# Patient Record
Sex: FEMALE | Race: WHITE | NOT HISPANIC OR LATINO | Employment: UNEMPLOYED | URBAN - NONMETROPOLITAN AREA
[De-identification: names, ages, dates, MRNs, and addresses within clinical notes are randomized per-mention and may not be internally consistent; named-entity substitution may affect disease eponyms.]

---

## 2018-03-15 ENCOUNTER — TRANSCRIBE ORDERS (OUTPATIENT)
Dept: CARDIOLOGY | Facility: HOSPITAL | Age: 73
End: 2018-03-15

## 2018-03-15 DIAGNOSIS — R07.9 CHEST PAIN, UNSPECIFIED TYPE: Primary | ICD-10-CM

## 2018-03-20 ENCOUNTER — APPOINTMENT (OUTPATIENT)
Dept: CARDIOLOGY | Facility: HOSPITAL | Age: 73
End: 2018-03-20

## 2018-03-20 ENCOUNTER — OUTSIDE FACILITY SERVICE (OUTPATIENT)
Dept: CARDIOLOGY | Facility: CLINIC | Age: 73
End: 2018-03-20

## 2018-03-20 ENCOUNTER — HOSPITAL ENCOUNTER (OUTPATIENT)
Dept: CARDIOLOGY | Facility: HOSPITAL | Age: 73
Discharge: HOME | End: 2018-03-20

## 2018-03-20 ENCOUNTER — HOSPITAL ENCOUNTER (OUTPATIENT)
Dept: CARDIOLOGY | Facility: HOSPITAL | Age: 73
End: 2018-03-20

## 2018-03-20 VITALS — HEIGHT: 64 IN | BODY MASS INDEX: 39.61 KG/M2 | WEIGHT: 232 LBS

## 2018-03-20 DIAGNOSIS — R07.9 CHEST PAIN, UNSPECIFIED TYPE: ICD-10-CM

## 2018-03-20 LAB
MAXIMAL PREDICTED HEART RATE: 149 BPM
STRESS TARGET HR: 127 BPM

## 2018-03-20 PROCEDURE — 93306 TTE W/DOPPLER COMPLETE: CPT

## 2018-03-20 PROCEDURE — 93306 TTE W/DOPPLER COMPLETE: CPT | Performed by: INTERNAL MEDICINE

## 2018-04-02 ENCOUNTER — APPOINTMENT (OUTPATIENT)
Dept: CARDIOLOGY | Facility: HOSPITAL | Age: 73
End: 2018-04-02

## 2018-04-04 ENCOUNTER — DOCUMENTATION (OUTPATIENT)
Dept: CARDIOLOGY | Facility: CLINIC | Age: 73
End: 2018-04-04

## 2018-04-04 NOTE — PROGRESS NOTES
PATIENT AWARE OF ECHO  RESULTS, AND TO PROCEED WITH GETTING HER STRESS TEST DONE ON 4-17-18.   PH,LPN

## 2018-04-17 ENCOUNTER — APPOINTMENT (OUTPATIENT)
Dept: CARDIOLOGY | Facility: HOSPITAL | Age: 73
End: 2018-04-17

## 2018-04-23 ENCOUNTER — HOSPITAL ENCOUNTER (OUTPATIENT)
Dept: CARDIOLOGY | Facility: HOSPITAL | Age: 73
Discharge: HOME | End: 2018-04-23

## 2018-04-23 ENCOUNTER — OUTSIDE FACILITY SERVICE (OUTPATIENT)
Dept: CARDIOLOGY | Facility: CLINIC | Age: 73
End: 2018-04-23

## 2018-04-23 DIAGNOSIS — R07.9 CHEST PAIN, UNSPECIFIED TYPE: ICD-10-CM

## 2018-04-23 LAB
MAXIMAL PREDICTED HEART RATE: 149 BPM
STRESS TARGET HR: 127 BPM

## 2018-04-23 PROCEDURE — 78452 HT MUSCLE IMAGE SPECT MULT: CPT | Performed by: INTERNAL MEDICINE

## 2018-04-23 PROCEDURE — 78452 HT MUSCLE IMAGE SPECT MULT: CPT

## 2018-04-23 PROCEDURE — 25010000002 REGADENOSON 0.4 MG/5ML SOLUTION: Performed by: INTERNAL MEDICINE

## 2018-04-23 PROCEDURE — 93018 CV STRESS TEST I&R ONLY: CPT | Performed by: INTERNAL MEDICINE

## 2018-04-23 PROCEDURE — 93017 CV STRESS TEST TRACING ONLY: CPT

## 2018-04-23 PROCEDURE — 0 TECHNETIUM SESTAMIBI: Performed by: INTERNAL MEDICINE

## 2018-04-23 PROCEDURE — A9500 TC99M SESTAMIBI: HCPCS | Performed by: INTERNAL MEDICINE

## 2018-04-23 RX ADMIN — TECHNETIUM TC 99M SESTAMIBI 1 DOSE: 1 INJECTION INTRAVENOUS at 09:00

## 2018-04-23 RX ADMIN — REGADENOSON 0.4 MG: 0.08 INJECTION, SOLUTION INTRAVENOUS at 09:00

## 2018-04-26 ENCOUNTER — OFFICE VISIT (OUTPATIENT)
Dept: CARDIOLOGY | Facility: CLINIC | Age: 73
End: 2018-04-26

## 2018-04-26 VITALS
HEART RATE: 83 BPM | SYSTOLIC BLOOD PRESSURE: 127 MMHG | WEIGHT: 232.4 LBS | HEIGHT: 64 IN | OXYGEN SATURATION: 94 % | BODY MASS INDEX: 39.67 KG/M2 | DIASTOLIC BLOOD PRESSURE: 79 MMHG

## 2018-04-26 DIAGNOSIS — R07.9 CHEST PAIN, UNSPECIFIED TYPE: ICD-10-CM

## 2018-04-26 DIAGNOSIS — R06.02 SHORTNESS OF BREATH: Primary | ICD-10-CM

## 2018-04-26 DIAGNOSIS — I10 ESSENTIAL HYPERTENSION: ICD-10-CM

## 2018-04-26 DIAGNOSIS — R00.2 PALPITATIONS: ICD-10-CM

## 2018-04-26 DIAGNOSIS — R53.83 OTHER FATIGUE: ICD-10-CM

## 2018-04-26 PROCEDURE — 99204 OFFICE O/P NEW MOD 45 MIN: CPT | Performed by: PHYSICIAN ASSISTANT

## 2018-04-26 RX ORDER — HYDROCHLOROTHIAZIDE 25 MG/1
25 TABLET ORAL DAILY
COMMUNITY

## 2018-04-26 RX ORDER — IBUPROFEN 800 MG/1
800 TABLET ORAL EVERY 6 HOURS PRN
COMMUNITY

## 2018-04-26 RX ORDER — AMITRIPTYLINE HYDROCHLORIDE 25 MG/1
25 TABLET, FILM COATED ORAL NIGHTLY
COMMUNITY
End: 2018-04-26

## 2018-04-26 RX ORDER — PAROXETINE HYDROCHLORIDE 20 MG/1
20 TABLET, FILM COATED ORAL 2 TIMES DAILY
COMMUNITY
End: 2018-04-26 | Stop reason: ALTCHOICE

## 2018-04-26 RX ORDER — METOPROLOL TARTRATE 50 MG/1
50 TABLET, FILM COATED ORAL 2 TIMES DAILY
COMMUNITY

## 2018-04-26 RX ORDER — AMLODIPINE BESYLATE 5 MG/1
5 TABLET ORAL DAILY
COMMUNITY
End: 2018-04-26 | Stop reason: ALTCHOICE

## 2018-04-26 RX ORDER — DICLOFENAC SODIUM 75 MG/1
75 TABLET, DELAYED RELEASE ORAL 2 TIMES DAILY
Qty: 60 TABLET | Refills: 6 | Status: SHIPPED | OUTPATIENT
Start: 2018-04-26 | End: 2018-04-26

## 2018-04-26 RX ORDER — SIMVASTATIN 40 MG
40 TABLET ORAL NIGHTLY
COMMUNITY

## 2018-04-26 RX ORDER — NITROGLYCERIN 0.4 MG/1
0.4 TABLET SUBLINGUAL AS NEEDED
COMMUNITY

## 2018-04-26 RX ORDER — OMEPRAZOLE 20 MG/1
20 CAPSULE, DELAYED RELEASE ORAL DAILY
COMMUNITY

## 2018-04-26 RX ORDER — TRAMADOL HYDROCHLORIDE 50 MG/1
50 TABLET ORAL EVERY 8 HOURS PRN
COMMUNITY

## 2018-04-26 RX ORDER — ESCITALOPRAM OXALATE 10 MG/1
10 TABLET ORAL DAILY
COMMUNITY

## 2018-04-26 NOTE — PROGRESS NOTES
Subjective   Jennifer Vidal is a 72 y.o. female     Chief Complaint   Patient presents with   • Establish Care     presents to establish care (stress and echo f/u)   • Chest Pain   • Palpitations   • Shortness of Breath   • Fatigue       HPI    Problem list  1.  Chest pain  1.1 stress test April 2018 demonstrates no evidence of ischemia and preserved LV function  2.  Mild aortic and mitral insufficiency  3.  Preserved systolic function  4.  Hypertension  5.  Dyslipidemia  6.  Type 2 diabetes mellitus  7.  Untreated sleep apnea because of patient noncompliance    Patient is a 72-year-old female that presents to the office today to establish care.  Recently she had stress test and echocardiogram ordered by primary physician because of her symptoms.  Stress test did not demonstrate any evidence of ischemia and echocardiogram was largely normal other than mild aortic and mitral insufficiency.    Patient describes to me that she has been having intermittent episodes of chest discomfort.  She describes substernal discomfort that is actually improved whenever she puts pressure on her sternum.  Patient will develop a slight aching sensation but will hold pressure on her sternum and her chest discomfort improves.  Chest pain occurs at random and only lasts for short periods of time.  Her shortness of breath is mild at baseline but nothing that has been progressive.  She describes no PND orthopnea.  Palpitations happen on occasion but no dizziness presyncope or syncope.    Patient does complain of much fatigue.  She describes sleeping frequently and having a lack of energy.  She describes being diagnosed with obstructive sleep apnea but could not tolerate the machine because of the noise.  Otherwise patient is doing well      Current Outpatient Prescriptions   Medication Sig Dispense Refill   • aspirin 81 MG tablet Take 81 mg by mouth Daily.     • escitalopram (LEXAPRO) 10 MG tablet Take 10 mg by mouth Daily.     •  Fluticasone Furoate-Vilanterol (BREO ELLIPTA) 100-25 MCG/INH aerosol powder  Inhale Daily.     • hydrochlorothiazide (HYDRODIURIL) 25 MG tablet Take 25 mg by mouth Daily.     • ibuprofen (ADVIL,MOTRIN) 800 MG tablet Take 800 mg by mouth Every 6 (Six) Hours As Needed for Mild Pain .     • metFORMIN (GLUCOPHAGE) 500 MG tablet Take 500 mg by mouth 2 (Two) Times a Day With Meals.     • metoprolol tartrate (LOPRESSOR) 50 MG tablet Take 50 mg by mouth 2 (Two) Times a Day.     • nitroglycerin (NITROSTAT) 0.4 MG SL tablet Place 0.4 mg under the tongue As Needed for Chest Pain. Take no more than 3 doses in 15 minutes.     • omeprazole (priLOSEC) 20 MG capsule Take 20 mg by mouth Daily.     • simvastatin (ZOCOR) 40 MG tablet Take 40 mg by mouth Every Night.     • traMADol (ULTRAM) 50 MG tablet Take 50 mg by mouth Every 8 (Eight) Hours As Needed for Moderate Pain .       No current facility-administered medications for this visit.        Codeine and Sulfa antibiotics    Past Medical History:   Diagnosis Date   • Diabetes mellitus    • Hyperlipidemia    • Hypertension        Social History     Social History   • Marital status: Unknown     Spouse name: N/A   • Number of children: N/A   • Years of education: N/A     Occupational History   • Not on file.     Social History Main Topics   • Smoking status: Never Smoker   • Smokeless tobacco: Never Used   • Alcohol use No   • Drug use: No   • Sexual activity: Not on file     Other Topics Concern   • Not on file     Social History Narrative   • No narrative on file           Family History   Problem Relation Age of Onset   • COPD Mother    • Heart failure Mother    • Heart attack Father    • Diabetes Father        Review of Systems   Constitutional: Positive for diaphoresis (night sweats) and fatigue (chronic).   HENT: Negative.    Eyes: Positive for visual disturbance (wears glasses).   Respiratory: Positive for shortness of breath (on exertion and times at rest).    Cardiovascular:  "Positive for chest pain, palpitations and leg swelling.   Gastrointestinal: Positive for diarrhea.   Endocrine: Negative.    Genitourinary: Negative.    Musculoskeletal: Positive for arthralgias, back pain, myalgias and neck pain.   Skin: Negative.    Allergic/Immunologic: Negative.  Negative for environmental allergies.   Neurological: Positive for dizziness, weakness (generalized) and light-headedness (after stress test ). Negative for syncope.   Hematological: Bruises/bleeds easily (bruise).   Psychiatric/Behavioral: Positive for agitation. Negative for sleep disturbance. The patient is nervous/anxious.    All other systems reviewed and are negative.      Objective   Vitals:    04/26/18 0918   BP: 127/79   BP Location: Left arm   Patient Position: Sitting   Pulse: 83   SpO2: 94%   Weight: 105 kg (232 lb 6.4 oz)   Height: 162.6 cm (64\")      /79 (BP Location: Left arm, Patient Position: Sitting)   Pulse 83   Ht 162.6 cm (64\")   Wt 105 kg (232 lb 6.4 oz)   SpO2 94%   BMI 39.89 kg/m²     Lab Results (most recent)     None          Physical Exam   Constitutional: She is oriented to person, place, and time. She appears well-developed and well-nourished. No distress.   HENT:   Head: Normocephalic and atraumatic.   Eyes: EOM are normal. Pupils are equal, round, and reactive to light.   Neck: No JVD present.   Cardiovascular: Normal rate, regular rhythm, normal heart sounds and intact distal pulses.  Exam reveals no gallop and no friction rub.    No murmur heard.  Pulmonary/Chest: Effort normal and breath sounds normal. No respiratory distress. She has no wheezes. She has no rales. She exhibits no tenderness.   Musculoskeletal: Normal range of motion. She exhibits no edema.   Neurological: She is alert and oriented to person, place, and time. No cranial nerve deficit.   Skin: Skin is warm and dry. No rash noted. No erythema. No pallor.   Psychiatric: She has a normal mood and affect. Her behavior is normal. "   Nursing note and vitals reviewed.      Procedure   Procedures         Assessment/Plan     Problems Addressed this Visit        Cardiovascular and Mediastinum    Essential hypertension    Relevant Medications    metoprolol tartrate (LOPRESSOR) 50 MG tablet    hydrochlorothiazide (HYDRODIURIL) 25 MG tablet    Palpitations       Respiratory    Shortness of breath - Primary       Nervous and Auditory    Chest pain       Other    Other fatigue      Other Visit Diagnoses    None.           Recommendation  1.  Patient's chest discomfort is clearly atypical in the fact that it is improved with palpation of her chest wall.  I feel that there could be an underlying musculoskeletal component of her chest discomfort and in that setting would like to try to treat with anti-inflammatory medications.  We will prescribe Voltaren and see her back in 3-4 weeks to see if there is any improvement.  We can consider further evaluation if her symptoms fail to improve but as of now, her symptoms are clearly atypical  2.  We discussed further cardiac evaluation but her symptoms are clearly atypical at this time.  She voices much concerned about why she is so fatigued whenever she is not compliant with CPAP therapy.  I discussed with her that sleep apnea and possibly her blood pressure medications are likely the cause of her fatigue.  I discussed with her that she could see a pulmonologist who may be able to help in regards to adjusting her CPAP device to help her tolerate it better.  3.  We will see her back for follow-up as scheduled.  She will follow-up with primary as scheduled                Patient's Body mass index is 39.89 kg/m². BMI is above normal parameters. Follow-up plan includes:  educational material.         Electronically signed by:

## 2018-04-26 NOTE — PATIENT INSTRUCTIONS
Heart-Healthy Eating Plan  Many factors influence your heart health, including eating and exercise habits. Heart (coronary) risk increases with abnormal blood fat (lipid) levels. Heart-healthy meal planning includes limiting unhealthy fats, increasing healthy fats, and making other small dietary changes. This includes maintaining a healthy body weight to help keep lipid levels within a normal range.  What is my plan?  Your health care provider recommends that you:  · Get no more than _________% of the total calories in your daily diet from fat.  · Limit your intake of saturated fat to less than _________% of your total calories each day.  · Limit the amount of cholesterol in your diet to less than _________ mg per day.  What types of fat should I choose?  · Choose healthy fats more often. Choose monounsaturated and polyunsaturated fats, such as olive oil and canola oil, flaxseeds, walnuts, almonds, and seeds.  · Eat more omega-3 fats. Good choices include salmon, mackerel, sardines, tuna, flaxseed oil, and ground flaxseeds. Aim to eat fish at least two times each week.  · Limit saturated fats. Saturated fats are primarily found in animal products, such as meats, butter, and cream. Plant sources of saturated fats include palm oil, palm kernel oil, and coconut oil.  · Avoid foods with partially hydrogenated oils in them. These contain trans fats. Examples of foods that contain trans fats are stick margarine, some tub margarines, cookies, crackers, and other baked goods.  What general guidelines do I need to follow?  · Check food labels carefully to identify foods with trans fats or high amounts of saturated fat.  · Fill one half of your plate with vegetables and green salads. Eat 4-5 servings of vegetables per day. A serving of vegetables equals 1 cup of raw leafy vegetables, ½ cup of raw or cooked cut-up vegetables, or ½ cup of vegetable juice.  · Fill one fourth of your plate with whole grains. Look for the word  "\"whole\" as the first word in the ingredient list.  · Fill one fourth of your plate with lean protein foods.  · Eat 4-5 servings of fruit per day. A serving of fruit equals one medium whole fruit, ¼ cup of dried fruit, ½ cup of fresh, frozen, or canned fruit, or ½ cup of 100% fruit juice.  · Eat more foods that contain soluble fiber. Examples of foods that contain this type of fiber are apples, broccoli, carrots, beans, peas, and barley. Aim to get 20-30 g of fiber per day.  · Eat more home-cooked food and less restaurant, buffet, and fast food.  · Limit or avoid alcohol.  · Limit foods that are high in starch and sugar.  · Avoid fried foods.  · Cook foods by using methods other than frying. Baking, boiling, grilling, and broiling are all great options. Other fat-reducing suggestions include:  ¨ Removing the skin from poultry.  ¨ Removing all visible fats from meats.  ¨ Skimming the fat off of stews, soups, and gravies before serving them.  ¨ Steaming vegetables in water or broth.  · Lose weight if you are overweight. Losing just 5-10% of your initial body weight can help your overall health and prevent diseases such as diabetes and heart disease.  · Increase your consumption of nuts, legumes, and seeds to 4-5 servings per week. One serving of dried beans or legumes equals ½ cup after being cooked, one serving of nuts equals 1½ ounces, and one serving of seeds equals ½ ounce or 1 tablespoon.  · You may need to monitor your salt (sodium) intake, especially if you have high blood pressure. Talk with your health care provider or dietitian to get more information about reducing sodium.  What foods can I eat?  Grains     Breads, including Ivorian, white, juan alberto, wheat, raisin, rye, oatmeal, and Italian. Tortillas that are neither fried nor made with lard or trans fat. Low-fat rolls, including hotdog and hamburger buns and English muffins. Biscuits. Muffins. Waffles. Pancakes. Light popcorn. Whole-grain cereals. Flatbread. " Elle toast. Pretzels. Breadsticks. Rusks. Low-fat snacks and crackers, including oyster, saltine, matzo, lex, animal, and rye. Rice and pasta, including brown rice and those that are made with whole wheat.  Vegetables   All vegetables.  Fruits   All fruits, but limit coconut.  Meats and Other Protein Sources   Lean, well-trimmed beef, veal, pork, and lamb. Chicken and turkey without skin. All fish and shellfish. Wild duck, rabbit, pheasant, and venison. Egg whites or low-cholesterol egg substitutes. Dried beans, peas, lentils, and tofu. Seeds and most nuts.  Dairy   Low-fat or nonfat cheeses, including ricotta, string, and mozzarella. Skim or 1% milk that is liquid, powdered, or evaporated. Buttermilk that is made with low-fat milk. Nonfat or low-fat yogurt.  Beverages   Mineral water. Diet carbonated beverages.  Sweets and Desserts   Sherbets and fruit ices. Honey, jam, marmalade, jelly, and syrups. Meringues and gelatins. Pure sugar candy, such as hard candy, jelly beans, gumdrops, mints, marshmallows, and small amounts of dark chocolate. Efrem food cake.  Eat all sweets and desserts in moderation.  Fats and Oils   Nonhydrogenated (trans-free) margarines. Vegetable oils, including soybean, sesame, sunflower, olive, peanut, safflower, corn, canola, and cottonseed. Salad dressings or mayonnaise that are made with a vegetable oil. Limit added fats and oils that you use for cooking, baking, salads, and as spreads.  Other   Cocoa powder. Coffee and tea. All seasonings and condiments.  The items listed above may not be a complete list of recommended foods or beverages. Contact your dietitian for more options.   What foods are not recommended?  Grains   Breads that are made with saturated or trans fats, oils, or whole milk. Croissants. Butter rolls. Cheese breads. Sweet rolls. Donuts. Buttered popcorn. Chow mein noodles. High-fat crackers, such as cheese or butter crackers.  Meats and Other Protein Sources   Fatty  meats, such as hotdogs, short ribs, sausage, spareribs, stanley, ribeye roast or steak, and mutton. High-fat deli meats, such as salami and bologna. Caviar. Domestic duck and goose. Organ meats, such as kidney, liver, sweetbreads, brains, gizzard, chitterlings, and heart.  Dairy   Cream, sour cream, cream cheese, and creamed cottage cheese. Whole milk cheeses, including blue (mira), [place] Danny, Brie, Eddie, American, Havarti, Swiss, cheddar, Camembert, and [place]. Whole or 2% milk that is liquid, evaporated, or condensed. Whole buttermilk. Cream sauce or high-fat cheese sauce. Yogurt that is made from whole milk.  Beverages   Regular sodas and drinks with added sugar.  Sweets and Desserts   Frosting. Pudding. Cookies. Cakes other than emile food cake. Candy that has milk chocolate or white chocolate, hydrogenated fat, butter, coconut, or unknown ingredients. Buttered syrups. Full-fat ice cream or ice cream drinks.  Fats and Oils   Gravy that has suet, meat fat, or shortening. Cocoa butter, hydrogenated oils, palm oil, coconut oil, palm kernel oil. These can often be found in baked products, candy, fried foods, nondairy creamers, and whipped toppings. Solid fats and shortenings, including stanley fat, salt pork, lard, and butter. Nondairy cream substitutes, such as coffee creamers and sour cream substitutes. Salad dressings that are made of unknown oils, cheese, or sour cream.  The items listed above may not be a complete list of foods and beverages to avoid. Contact your dietitian for more information.   This information is not intended to replace advice given to you by your health care provider. Make sure you discuss any questions you have with your health care provider.  Document Released: 09/26/2009 Document Revised: 07/07/2017 Document Reviewed: 06/11/2015  [employer] Interactive Patient Education © 2017 [employer]

## 2018-06-14 ENCOUNTER — OFFICE VISIT (OUTPATIENT)
Dept: CARDIOLOGY | Facility: CLINIC | Age: 73
End: 2018-06-14

## 2018-06-14 VITALS
BODY MASS INDEX: 39.27 KG/M2 | DIASTOLIC BLOOD PRESSURE: 81 MMHG | SYSTOLIC BLOOD PRESSURE: 151 MMHG | HEIGHT: 64 IN | WEIGHT: 230 LBS | HEART RATE: 78 BPM | OXYGEN SATURATION: 96 %

## 2018-06-14 DIAGNOSIS — R06.02 SHORTNESS OF BREATH: ICD-10-CM

## 2018-06-14 DIAGNOSIS — I25.10 CORONARY ARTERY DISEASE INVOLVING NATIVE CORONARY ARTERY OF NATIVE HEART WITHOUT ANGINA PECTORIS: ICD-10-CM

## 2018-06-14 DIAGNOSIS — R07.9 CHEST PAIN, UNSPECIFIED TYPE: ICD-10-CM

## 2018-06-14 DIAGNOSIS — R07.9 CHEST PAIN, UNSPECIFIED TYPE: Primary | ICD-10-CM

## 2018-06-14 PROCEDURE — 99214 OFFICE O/P EST MOD 30 MIN: CPT | Performed by: PHYSICIAN ASSISTANT

## 2018-06-14 RX ORDER — METOPROLOL TARTRATE 100 MG/1
TABLET ORAL
Qty: 2 TABLET | Refills: 0 | Status: SHIPPED | OUTPATIENT
Start: 2018-06-14 | End: 2018-06-14 | Stop reason: SDUPTHER

## 2018-06-14 RX ORDER — METOPROLOL TARTRATE 100 MG/1
TABLET ORAL
Qty: 2 TABLET | Refills: 0 | Status: SHIPPED | OUTPATIENT
Start: 2018-06-14

## 2018-06-14 NOTE — PROGRESS NOTES
Problem list     Subjective   Jennifer Vidal is a 72 y.o. female     Chief Complaint   Patient presents with   • Hypertension     Here for 1-2 mo. f/u   • Hyperlipidemia   • Diabetes   • Shortness of Breath       HPI    Problem list  1.  Chest pain  1.1 stress test April 2018 demonstrates no evidence of ischemia and preserved LV function  1.2 history of nonobstructive coronary artery disease in the distant past, inadequate data  2.  Mild aortic and mitral insufficiency  3.  Preserved systolic function  4.  Hypertension  5.  Dyslipidemia  6.  Type 2 diabetes mellitus  7.  Untreated sleep apnea because of patient noncompliance    Patient is a 72-year-old female that presents back to the office for follow-up.  Last office visit we reviewed stress test which demonstrated no evidence of ischemia.  However she continued to describe discomfort.  There were some atypical features of her chest pain and musculoskeletal causes was high on the differential.  It was attempted to treat with anti-inflammatory medication.  She does not describe much improvement in that regard.    She describes continuing to have a heaviness on the left side of her chest and back.  She describes this being persistent since her last visit here.  She has mild levels of exertional dyspnea as well.  She also complains of occasional PND or orthopnea.  She denies any dysrhythmic symptoms.  Otherwise is doing well          Outpatient Encounter Prescriptions as of 6/14/2018   Medication Sig Dispense Refill   • aspirin 81 MG tablet Take 81 mg by mouth Daily.     • escitalopram (LEXAPRO) 10 MG tablet Take 10 mg by mouth Daily.     • Fluticasone Furoate-Vilanterol (BREO ELLIPTA) 100-25 MCG/INH aerosol powder  Inhale Daily.     • hydrochlorothiazide (HYDRODIURIL) 25 MG tablet Take 25 mg by mouth Daily.     • ibuprofen (ADVIL,MOTRIN) 800 MG tablet Take 800 mg by mouth Every 6 (Six) Hours As Needed for Mild Pain .     • metFORMIN (GLUCOPHAGE) 500 MG tablet Take  500 mg by mouth 2 (Two) Times a Day With Meals.     • metoprolol tartrate (LOPRESSOR) 50 MG tablet Take 50 mg by mouth 2 (Two) Times a Day.     • omeprazole (priLOSEC) 20 MG capsule Take 20 mg by mouth Daily.     • simvastatin (ZOCOR) 40 MG tablet Take 40 mg by mouth Every Night.     • traMADol (ULTRAM) 50 MG tablet Take 50 mg by mouth Every 8 (Eight) Hours As Needed for Moderate Pain .     • nitroglycerin (NITROSTAT) 0.4 MG SL tablet Place 0.4 mg under the tongue As Needed for Chest Pain. Take no more than 3 doses in 15 minutes.       No facility-administered encounter medications on file as of 6/14/2018.        Codeine and Sulfa antibiotics    Past Medical History:   Diagnosis Date   • Diabetes mellitus    • Hyperlipidemia    • Hypertension        Social History     Social History   • Marital status: Unknown     Spouse name: N/A   • Number of children: N/A   • Years of education: N/A     Occupational History   • Not on file.     Social History Main Topics   • Smoking status: Never Smoker   • Smokeless tobacco: Never Used   • Alcohol use No   • Drug use: No   • Sexual activity: Not on file     Other Topics Concern   • Not on file     Social History Narrative   • No narrative on file       Family History   Problem Relation Age of Onset   • COPD Mother    • Heart failure Mother    • Heart attack Father    • Diabetes Father        Review of Systems   Constitutional: Positive for fatigue.   HENT: Negative.    Eyes: Positive for visual disturbance (glasses prn).   Respiratory: Positive for shortness of breath.    Cardiovascular: Positive for chest pain and leg swelling. Negative for palpitations.   Gastrointestinal: Negative.    Endocrine: Negative.    Genitourinary: Negative.    Musculoskeletal: Positive for arthralgias and myalgias.   Skin: Negative.    Allergic/Immunologic: Negative.    Neurological: Negative.    Hematological: Negative.    Psychiatric/Behavioral: Positive for sleep disturbance (off and on).   All  "other systems reviewed and are negative.      Objective   Vitals:    06/14/18 0842   BP: 151/81   BP Location: Left arm   Patient Position: Sitting   Pulse: 78   SpO2: 96%   Weight: 104 kg (230 lb)   Height: 162.6 cm (64.02\")      /81 (BP Location: Left arm, Patient Position: Sitting)   Pulse 78   Ht 162.6 cm (64.02\")   Wt 104 kg (230 lb)   SpO2 96%   BMI 39.46 kg/m²     Lab Results (most recent)     None          Physical Exam   Constitutional: She is oriented to person, place, and time. She appears well-developed and well-nourished. No distress.   HENT:   Head: Normocephalic and atraumatic.   Eyes: EOM are normal. Pupils are equal, round, and reactive to light.   Neck: No JVD present.   Cardiovascular: Normal rate, regular rhythm, normal heart sounds and intact distal pulses.  Exam reveals no gallop and no friction rub.    No murmur heard.  Pulmonary/Chest: Effort normal and breath sounds normal. No respiratory distress. She has no wheezes. She has no rales. She exhibits no tenderness.   Musculoskeletal: Normal range of motion. She exhibits no edema.   Neurological: She is alert and oriented to person, place, and time. No cranial nerve deficit.   Skin: Skin is warm and dry. No rash noted. No erythema. No pallor.   Psychiatric: She has a normal mood and affect. Her behavior is normal.   Nursing note and vitals reviewed.      Procedure   Procedures       Assessment/Plan     Problems Addressed this Visit        Cardiovascular and Mediastinum    Coronary artery disease involving native coronary artery of native heart without angina pectoris    Relevant Orders    CT Angiogram Heart With 3D Image       Respiratory    Shortness of breath    Relevant Orders    CT Angiogram Heart With 3D Image       Nervous and Auditory    Chest pain - Primary    Relevant Orders    CT Angiogram Heart With 3D Image           recommendation  1.  I am concerned about patient's persistent chest discomfort despite being treated with " antianginal medication.  Furthermore, noncardiac causes have been attempted to treat her discomfort and has failed.  Patient does have significant risk factors for coronary artery disease to include type 2 diabetes mellitus, hypertension, dyslipidemia and obesity.  Her chest discomfort has some characteristics concerning for angina.  I feel that we need further evaluation in this patient because of persistent symptoms and significant risk factors.  We have discussed cardiac catheterization and have also discussed cardiac CTA.  Patient would like to perform a cardiac CTA as a less invasive means to evaluate coronary anatomy.  Therefore, we will schedule accordingly.  She is on aspirin and statin therapy.  She has nitroglycerin as needed for chest pain.  We will schedule for testing and see her back for follow-up.  Follow-up with primary as scheduled            Patient's Body mass index is 39.46 kg/m². BMI is above normal parameters. Recommendations include: educational material.       Electronically signed by:

## 2018-06-14 NOTE — PATIENT INSTRUCTIONS
Obesity, Adult  Obesity is the condition of having too much total body fat. Being overweight or obese means that your weight is greater than what is considered healthy for your body size. Obesity is determined by a measurement called BMI. BMI is an estimate of body fat and is calculated from height and weight. For adults, a BMI of 30 or higher is considered obese.  Obesity can eventually lead to other health concerns and major illnesses, including:  · Stroke.  · Coronary artery disease (CAD).  · Type 2 diabetes.  · Some types of cancer, including cancers of the colon, breast, uterus, and gallbladder.  · Osteoarthritis.  · High blood pressure (hypertension).  · High cholesterol.  · Sleep apnea.  · Gallbladder stones.  · Infertility problems.  What are the causes?  The main cause of obesity is taking in (consuming) more calories than your body uses for energy. Other factors that contribute to this condition may include:  · Being born with genes that make you more likely to become obese.  · Having a medical condition that causes obesity. These conditions include:  ¨ Hypothyroidism.  ¨ Polycystic ovarian syndrome (PCOS).  ¨ Binge-eating disorder.  ¨ Cushing syndrome.  · Taking certain medicines, such as steroids, antidepressants, and seizure medicines.  · Not being physically active (sedentary lifestyle).  · Living where there are limited places to exercise safely or buy healthy foods.  · Not getting enough sleep.  What increases the risk?  The following factors may increase your risk of this condition:  · Having a family history of obesity.  · Being a woman of -American descent.  · Being a man of  descent.  What are the signs or symptoms?  Having excessive body fat is the main symptom of this condition.  How is this diagnosed?  This condition may be diagnosed based on:  · Your symptoms.  · Your medical history.  · A physical exam. Your health care provider may measure:  ¨ Your BMI. If you are an adult  with a BMI between 25 and less than 30, you are considered overweight. If you are an adult with a BMI of 30 or higher, you are considered obese.  ¨ The distances around your hips and your waist (circumferences). These may be compared to each other to help diagnose your condition.  ¨ Your skinfold thickness. Your health care provider may gently pinch a fold of your skin and measure it.  How is this treated?  Treatment for this condition often includes changing your lifestyle. Treatment may include some or all of the following:  · Dietary changes. Work with your health care provider and a dietitian to set a weight-loss goal that is healthy and reasonable for you. Dietary changes may include eating:  ¨ Smaller portions. A portion size is the amount of a particular food that is healthy for you to eat at one time. This varies from person to person.  ¨ Low-calorie or low-fat options.  ¨ More whole grains, fruits, and vegetables.  · Regular physical activity. This may include aerobic activity (cardio) and strength training.  · Medicine to help you lose weight. Your health care provider may prescribe medicine if you are unable to lose 1 pound a week after 6 weeks of eating more healthily and doing more physical activity.  · Surgery. Surgical options may include gastric banding and gastric bypass. Surgery may be done if:  ¨ Other treatments have not helped to improve your condition.  ¨ You have a BMI of 40 or higher.  ¨ You have life-threatening health problems related to obesity.  Follow these instructions at home:     Eating and drinking     · Follow recommendations from your health care provider about what you eat and drink. Your health care provider may advise you to:  ¨ Limit fast foods, sweets, and processed snack foods.  ¨ Choose low-fat options, such as low-fat milk instead of whole milk.  ¨ Eat 5 or more servings of fruits or vegetables every day.  ¨ Eat at home more often. This gives you more control over what you  eat.  ¨ Choose healthy foods when you eat out.  ¨ Learn what a healthy portion size is.  ¨ Keep low-fat snacks on hand.  ¨ Avoid sugary drinks, such as soda, fruit juice, iced tea sweetened with sugar, and flavored milk.  ¨ Eat a healthy breakfast.  · Drink enough water to keep your urine clear or pale yellow.  · Do not go without eating for long periods of time (do not fast) or follow a fad diet. Fasting and fad diets can be unhealthy and even dangerous.  Physical Activity   · Exercise regularly, as told by your health care provider. Ask your health care provider what types of exercise are safe for you and how often you should exercise.  · Warm up and stretch before being active.  · Cool down and stretch after being active.  · Rest between periods of activity.  Lifestyle   · Limit the time that you spend in front of your TV, computer, or video game system.  · Find ways to reward yourself that do not involve food.  · Limit alcohol intake to no more than 1 drink a day for nonpregnant women and 2 drinks a day for men. One drink equals 12 oz of beer, 5 oz of wine, or 1½ oz of hard liquor.  General instructions   · Keep a weight loss journal to keep track of the food you eat and how much you exercise you get.  · Take over-the-counter and prescription medicines only as told by your health care provider.  · Take vitamins and supplements only as told by your health care provider.  · Consider joining a support group. Your health care provider may be able to recommend a support group.  · Keep all follow-up visits as told by your health care provider. This is important.  Contact a health care provider if:  · You are unable to meet your weight loss goal after 6 weeks of dietary and lifestyle changes.  This information is not intended to replace advice given to you by your health care provider. Make sure you discuss any questions you have with your health care provider.  Document Released: 01/25/2006 Document Revised:  05/22/2017 Document Reviewed: 10/05/2016  [employer] Interactive Patient Education © 2017 [employer]  MyPlate from [employer]  The general, healthful diet is based on the 2010 Dietary Guidelines for Americans. The amount of food you need to eat from each food group depends on your age, sex, and level of physical activity and can be individualized by a dietitian. Go to ChooseMyPlate.gov for more information.  What do I need to know about the MyPlate plan?  · Enjoy your food, but eat less.  · Avoid oversized portions.  ¨ ½ of your plate should include fruits and vegetables.  ¨ ¼ of your plate should be grains.  ¨ ¼ of your plate should be protein.  Grains   · Make at least half of your grains whole grains.  · For a 2,000 calorie daily food plan, eat 6 oz every day.  · 1 oz is about 1 slice bread, 1 cup cereal, or ½ cup cooked rice, cereal, or pasta.  Vegetables   · Make half your plate fruits and vegetables.  · For a 2,000 calorie daily food plan, eat 2½ cups every day.  · 1 cup is about 1 cup raw or cooked vegetables or vegetable juice or 2 cups raw leafy greens.  Fruits   · Make half your plate fruits and vegetables.  · For a 2,000 calorie daily food plan, eat 2 cups every day.  · 1 cup is about 1 cup fruit or 100% fruit juice or ½ cup dried fruit.  Protein   · For a 2,000 calorie daily food plan, eat 5½ oz every day.  · 1 oz is about 1 oz meat, poultry, or fish, ¼ cup cooked beans, 1 egg, 1 Tbsp peanut butter, or ½ oz nuts or seeds.  Dairy   · Switch to fat-free or low-fat (1%) milk.  · For a 2,000 calorie daily food plan, eat 3 cups every day.  · 1 cup is about 1 cup milk or yogurt or soy milk (soy beverage), 1½ oz natural cheese, or 2 oz processed cheese.  Fats, Oils, and Empty Calories   · Only small amounts of oils are recommended.  · Empty calories are calories from solid fats or added sugars.  · Compare sodium in foods like soup, bread, and frozen meals. Choose the foods with lower numbers.  · Drink water instead  of sugary drinks.  What foods can I eat?  Grains   Whole grains such as whole wheat, quinoa, millet, and bulgur. Bread, rolls, and pasta made from whole grains. Brown or wild rice. Hot or cold cereals made from whole grains and without added sugar.  Vegetables   All fresh vegetables, especially fresh red, dark green, or orange vegetables. Peas and beans. Low-sodium frozen or canned vegetables prepared without added salt. Low-sodium vegetable juices.  Fruits   All fresh, frozen, and dried fruits. Canned fruit packed in water or fruit juice without added sugar. Fruit juices without added sugar.  Meats and Other Protein Sources   Boiled, baked, or grilled lean meat trimmed of fat. Skinless poultry. Fresh seafood and shellfish. Canned seafood packed in water. Unsalted nuts and unsalted nut butters. Tofu. Dried beans and pea. Eggs.  Dairy   Low-fat or fat-free milk, yogurt, and cheeses.  Sweets and Desserts   Frozen desserts made from low-fat milk.  Fats and Oils   Olive, peanut, and canola oils and margarine. Salad dressing and mayonnaise made from these oils.  Other   Soups and casseroles made from allowed ingredients and without added fat or salt.  The items listed above may not be a complete list of recommended foods or beverages. Contact your dietitian for more options.   What foods are not recommended?  Grains   Sweetened, low-fiber cereals. Packaged baked goods. Snack crackers and chips. Cheese crackers, butter crackers, and biscuits. Frozen waffles, sweet breads, doughnuts, pastries, packaged baking mixes, pancakes, cakes, and cookies.  Vegetables   Regular canned or frozen vegetables or vegetables prepared with salt. Canned tomatoes. Canned tomato sauce. Fried vegetables. Vegetables in cream sauce or cheese sauce.  Fruits   Fruits packed in syrup or made with added sugar.  Meats and Other Protein Sources   Marbled or fatty meats such as ribs. Poultry with skin. Fried meats, poultry, eggs, or fish. Sausages, hot  dogs, and deli meats such as pastrami, bologna, or salami.  Dairy   Whole milk, cream, cheeses made from whole milk, sour cream. Ice cream or yogurt made from whole milk or with added sugar.  Beverages   For adults, no more than one alcoholic drink per day. Regular soft drinks or other sugary beverages. Juice drinks.  Sweets and Desserts   Sugary or fatty desserts, candy, and other sweets.  Fats and Oils   Solid shortening or partially hydrogenated oils. Solid margarine. Margarine that contains trans fats. Butter.  The items listed above may not be a complete list of foods and beverages to avoid. Contact your dietitian for more information.   This information is not intended to replace advice given to you by your health care provider. Make sure you discuss any questions you have with your health care provider.  Document Released: 01/06/2009 Document Revised: 05/25/2017 Document Reviewed: 11/26/2014  [employer] Interactive Patient Education © 2017 [employer].

## 2018-07-16 ENCOUNTER — TELEPHONE (OUTPATIENT)
Dept: CARDIOLOGY | Facility: CLINIC | Age: 73
End: 2018-07-16

## 2018-07-16 DIAGNOSIS — R07.2 PRECORDIAL PAIN: Primary | ICD-10-CM

## 2018-07-16 DIAGNOSIS — I25.10 CORONARY ARTERY DISEASE DUE TO CALCIFIED CORONARY LESION: ICD-10-CM

## 2018-07-16 DIAGNOSIS — R06.02 SHORTNESS OF BREATH: ICD-10-CM

## 2018-07-16 DIAGNOSIS — I25.84 CORONARY ARTERY DISEASE DUE TO CALCIFIED CORONARY LESION: ICD-10-CM

## 2018-07-16 NOTE — TELEPHONE ENCOUNTER
----- Message from Vanessa Jones MA sent at 7/16/2018  2:12 PM EDT -----  Kailey called [facility] needing a bmp order for the patient. Fax to [phone]

## 2024-08-29 ENCOUNTER — TELEPHONE (OUTPATIENT)
Dept: CARDIOLOGY | Facility: CLINIC | Age: 79
End: 2024-08-29
Payer: MEDICARE

## 2024-08-29 NOTE — TELEPHONE ENCOUNTER
Caller: Jennifer Vidal    Relationship to patient: Self    Best call back number: [phone]    Chief complaint: PT ACCIDENTALLY CANCELED HER APPT WITH DR. VALLE TOMORROW THE 30TH AND NEEDS TO RESCHEDULE.     Type of visit: NEW PT    Requested date: ASAP PER HER PCP     If rescheduling, when is the original appointment: 08.30.24     Additional notes: HER PCP IS ASKING WE TRY TO GET THIS APPT AS QUICK AS POSSIBLE.

## 2024-08-30 ENCOUNTER — OFFICE VISIT (OUTPATIENT)
Dept: CARDIOLOGY | Facility: CLINIC | Age: 79
End: 2024-08-30
Payer: MEDICARE

## 2024-08-30 VITALS
HEART RATE: 60 BPM | DIASTOLIC BLOOD PRESSURE: 72 MMHG | BODY MASS INDEX: 31.92 KG/M2 | OXYGEN SATURATION: 97 % | SYSTOLIC BLOOD PRESSURE: 113 MMHG | WEIGHT: 187 LBS | HEIGHT: 64 IN

## 2024-08-30 DIAGNOSIS — E78.5 DYSLIPIDEMIA: ICD-10-CM

## 2024-08-30 DIAGNOSIS — I10 ESSENTIAL HYPERTENSION: ICD-10-CM

## 2024-08-30 DIAGNOSIS — R07.2 PRECORDIAL PAIN: Primary | ICD-10-CM

## 2024-08-30 DIAGNOSIS — I25.10 CORONARY ARTERY DISEASE INVOLVING NATIVE CORONARY ARTERY OF NATIVE HEART WITHOUT ANGINA PECTORIS: ICD-10-CM

## 2024-08-30 DIAGNOSIS — R06.02 SHORTNESS OF BREATH: ICD-10-CM

## 2024-08-30 PROCEDURE — 3074F SYST BP LT 130 MM HG: CPT | Performed by: CLINICAL NURSE SPECIALIST

## 2024-08-30 PROCEDURE — 3078F DIAST BP <80 MM HG: CPT | Performed by: CLINICAL NURSE SPECIALIST

## 2024-08-30 PROCEDURE — 99204 OFFICE O/P NEW MOD 45 MIN: CPT | Performed by: CLINICAL NURSE SPECIALIST

## 2024-08-30 RX ORDER — OMEGA-3S/DHA/EPA/FISH OIL/D3 300MG-1000
400 CAPSULE ORAL DAILY
COMMUNITY

## 2024-08-30 RX ORDER — LISINOPRIL 20 MG/1
20 TABLET ORAL DAILY
COMMUNITY

## 2024-08-30 RX ORDER — PAROXETINE 40 MG/1
40 TABLET, FILM COATED ORAL EVERY MORNING
COMMUNITY

## 2024-08-30 RX ORDER — TRAZODONE HYDROCHLORIDE 100 MG/1
100 TABLET ORAL NIGHTLY
COMMUNITY

## 2024-08-30 RX ORDER — ALLOPURINOL 100 MG/1
100 TABLET ORAL DAILY
COMMUNITY
Start: 2024-07-30

## 2024-08-30 RX ORDER — ORPHENADRINE CITRATE 100 MG/1
100 TABLET, EXTENDED RELEASE ORAL 2 TIMES DAILY
COMMUNITY

## 2024-08-30 NOTE — PROGRESS NOTES
Subjective     Jennifer Vidal is a 79 y.o. female who presents today for Establish Care (Chest heaviness/).    CHIEF COMPLIANT  Chief Complaint   Patient presents with    Establish Care     Chest heaviness         Active Problems:  1.  Chest pain  1.1 stress test April 2018 demonstrates no evidence of ischemia and preserved LV function  1.2 history of nonobstructive coronary artery disease in the distant past, inadequate data  2.  Mild aortic and mitral insufficiency  3.  Preserved systolic function  4.  Hypertension  5.  Dyslipidemia  6.  Type 2 diabetes mellitus  7.  Untreated sleep apnea because of patient noncompliance    HPI  The patient is a 79 year old female that returns to the office to reestablish care.  She recently had an emergency department visit due to chest pain and dyspnea.  Troponins were negative.  EKG showed no acute changes.  The patient states she has midsternal chest pain that radiates to her back.  She has associated dyspnea.  She does feel the dyspnea is worse with exertion.  The chest pain can occur with exertion but is also occurred at rest.  She has not had to take nitroglycerin.  She denies syncope, near syncope or palpitations.  Heart rate and blood pressure stable.  We did review her labs from the hospital.  Overall cholesterol 145, triglycerides 213, HDL 41, LDL 82.    PRIOR MEDS  Current Outpatient Medications on File Prior to Visit   Medication Sig Dispense Refill    allopurinol (ZYLOPRIM) 100 MG tablet Take 1 tablet by mouth Daily.      aspirin 81 MG tablet Take 1 tablet by mouth Daily.      Cholecalciferol 10 MCG (400 UNIT) tablet Take 1 tablet by mouth Daily.      escitalopram (LEXAPRO) 10 MG tablet Take 1 tablet by mouth Daily.      ibuprofen (ADVIL,MOTRIN) 800 MG tablet Take 1 tablet by mouth Every 6 (Six) Hours As Needed for Mild Pain.      lisinopril (PRINIVIL,ZESTRIL) 20 MG tablet Take 1 tablet by mouth Daily.      metoprolol tartrate (LOPRESSOR) 50 MG tablet Take 0.5  tablets by mouth Daily.      nitroglycerin (NITROSTAT) 0.4 MG SL tablet Place 1 tablet under the tongue As Needed for Chest Pain. Take no more than 3 doses in 15 minutes.      omeprazole (priLOSEC) 20 MG capsule Take 1 capsule by mouth Daily.      orphenadrine (NORFLEX) 100 MG 12 hr tablet Take 1 tablet by mouth 2 (Two) Times a Day.      PARoxetine (PAXIL) 40 MG tablet Take 1 tablet by mouth Every Morning.      simvastatin (ZOCOR) 40 MG tablet Take 1 tablet by mouth Every Night.      traZODone (DESYREL) 100 MG tablet Take 1 tablet by mouth Every Night.      Fluticasone Furoate-Vilanterol (BREO ELLIPTA) 100-25 MCG/INH aerosol powder  Inhale Daily.      hydrochlorothiazide (HYDRODIURIL) 25 MG tablet Take 1 tablet by mouth Daily.      metFORMIN (GLUCOPHAGE) 500 MG tablet Take 1 tablet by mouth 2 (Two) Times a Day With Meals.      metoprolol tartrate (LOPRESSOR) 100 MG tablet TAKE 1 TABLET BY MOUTH ONE HOUR PRIOR TO CTA APPOINTMENT, AND TAKE 1 TABLET TO CTA APPOINTMENT 2 tablet 0    traMADol (ULTRAM) 50 MG tablet Take 1 tablet by mouth Every 8 (Eight) Hours As Needed for Moderate Pain.       No current facility-administered medications on file prior to visit.       ALLERGIES  Codeine and Sulfa antibiotics    HISTORY  Past Medical History:   Diagnosis Date    Diabetes mellitus     Hyperlipidemia     Hypertension        Social History     Socioeconomic History    Marital status: Unknown   Tobacco Use    Smoking status: Never    Smokeless tobacco: Never   Vaping Use    Vaping status: Never Used   Substance and Sexual Activity    Alcohol use: No    Drug use: No    Sexual activity: Defer       Family History   Problem Relation Age of Onset    COPD Mother     Heart failure Mother     Heart attack Father     Diabetes Father        Review of Systems   Constitutional:  Negative for fever.   HENT:  Positive for congestion and rhinorrhea. Negative for postnasal drip, sinus pressure and sinus pain.    Respiratory:  Positive for  "chest tightness and shortness of breath.    Cardiovascular:  Positive for leg swelling. Negative for chest pain and palpitations.   Gastrointestinal: Negative.    Genitourinary: Negative.    Neurological:  Positive for dizziness. Negative for syncope and headaches.   Hematological:  Bruises/bleeds easily.   Psychiatric/Behavioral:  Negative for sleep disturbance.        Objective     VITALS: /72 (BP Location: Left arm, Patient Position: Sitting, Cuff Size: Adult)   Pulse 60   Ht 162.6 cm (64\")   Wt 84.8 kg (187 lb)   SpO2 97%   BMI 32.10 kg/m²     LABS:   Lab Results (most recent)       None            IMAGING:   No Images in the past 120 days found..    EXAM:  Physical Exam  Constitutional:       Appearance: Normal appearance.   Eyes:      Pupils: Pupils are equal, round, and reactive to light.   Cardiovascular:      Rate and Rhythm: Normal rate and regular rhythm.      Pulses:           Carotid pulses are 2+ on the right side and 2+ on the left side.       Radial pulses are 2+ on the right side and 2+ on the left side.        Dorsalis pedis pulses are 2+ on the right side and 2+ on the left side.        Posterior tibial pulses are 2+ on the right side and 2+ on the left side.      Heart sounds: Normal heart sounds.   Pulmonary:      Effort: Pulmonary effort is normal.      Breath sounds: Normal breath sounds.   Abdominal:      General: Bowel sounds are normal.      Palpations: Abdomen is soft.   Musculoskeletal:      Right lower leg: No edema.      Left lower leg: No edema.   Skin:     General: Skin is warm and dry.      Capillary Refill: Capillary refill takes less than 2 seconds.   Neurological:      General: No focal deficit present.      Mental Status: She is alert and oriented to person, place, and time.   Psychiatric:         Mood and Affect: Mood normal.         Thought Content: Thought content normal.         Procedure   Procedures       Assessment & Plan    Diagnosis Plan   1. Precordial pain  " Adult Transthoracic Echo Complete W/ Cont if Necessary Per Protocol    Stress Test With Myocardial Perfusion One Day      2. Shortness of breath  Adult Transthoracic Echo Complete W/ Cont if Necessary Per Protocol    Stress Test With Myocardial Perfusion One Day      3. Coronary artery disease involving native coronary artery of native heart without angina pectoris        4. Essential hypertension        5. Dyslipidemia          Plan:  1.  Precordial pain: We will schedule her for a nuclear stress test for ischemic evaluation echocardiogram to evaluate LV function structural anatomy.  The patient does have sublingual nitroglycerin to use if needed.  We did review instructions on appropriate use and when to seek emergency treatment.  Will plan to see her back after testing to review results.  2.  Dyspnea: We will proceed with stress test and echocardiogram as discussed above.  3.  CAD: Patient has known nonobstructive disease from several years ago.  Continue medical management.  Will proceed with testing as described above.  4.  Essential hypertension: Blood pressure under good control current medication regimen.  The patient was instructed to monitor BP at home and to notify our office if systolic BP is consistently greater than 130-135 systolic.  Goal BP is 130-135/70-80.  5.  Dyslipidemia: Continue statin.  Will periodically review lipid panel.    Return in about 2 months (around 10/30/2024).    Jennifer Vidal  reports that she has never smoked. She has never used smokeless tobacco.        Advance Care Planning   ACP discussion was declined by the patient. Patient does not have an advance directive, declines further assistance.         BMI cannot be calculated due to outdated height or weight values.  Please input a current height/weight in Vitals and re-renter BMIFOLLOWUP in Note to pull in correct documentation based on BMI range.           MEDS ORDERED DURING VISIT:  No orders of the defined types were  placed in this encounter.      DISCONTINUED MEDS DURING VISIT:   There are no discontinued medications.       This document has been electronically signed by SHEN Benson  August 30, 2024 10:27 EDT    Dictated Utilizing Dragon Dictation: Part of this note may be an electronic transcription/translation of spoken language to printed text using the Dragon Dictation System

## 2024-10-01 ENCOUNTER — HOSPITAL ENCOUNTER (OUTPATIENT)
Dept: CARDIOLOGY | Facility: HOSPITAL | Age: 79
Discharge: HOME | End: 2024-10-01
Payer: MEDICARE

## 2024-10-01 DIAGNOSIS — R07.2 PRECORDIAL PAIN: ICD-10-CM

## 2024-10-01 DIAGNOSIS — R06.02 SHORTNESS OF BREATH: ICD-10-CM

## 2024-10-01 LAB
BH CV ECHO MEAS - ACS: 2.12 CM
BH CV ECHO MEAS - AI P1/2T: 753.1 MSEC
BH CV ECHO MEAS - AO MAX PG: 6 MMHG
BH CV ECHO MEAS - AO MEAN PG: 3.5 MMHG
BH CV ECHO MEAS - AO ROOT DIAM: 3.6 CM
BH CV ECHO MEAS - AO V2 MAX: 122.2 CM/SEC
BH CV ECHO MEAS - AO V2 VTI: 28.1 CM
BH CV ECHO MEAS - EDV(CUBED): 79 ML
BH CV ECHO MEAS - EDV(MOD-SP4): 102 ML
BH CV ECHO MEAS - EF(MOD-SP4): 66.5 %
BH CV ECHO MEAS - EF_3D-VOL: 59 %
BH CV ECHO MEAS - ESV(CUBED): 15.1 ML
BH CV ECHO MEAS - ESV(MOD-SP4): 34.2 ML
BH CV ECHO MEAS - FS: 42.4 %
BH CV ECHO MEAS - IVS/LVPW: 1.02 CM
BH CV ECHO MEAS - IVSD: 1.24 CM
BH CV ECHO MEAS - LA DIMENSION: 3.8 CM
BH CV ECHO MEAS - LAT PEAK E' VEL: 6.2 CM/SEC
BH CV ECHO MEAS - LV DIASTOLIC VOL/BSA (35-75): 53.7 CM2
BH CV ECHO MEAS - LV MASS(C)D: 190.8 GRAMS
BH CV ECHO MEAS - LV SYSTOLIC VOL/BSA (12-30): 18 CM2
BH CV ECHO MEAS - LVIDD: 4.3 CM
BH CV ECHO MEAS - LVIDS: 2.47 CM
BH CV ECHO MEAS - LVPWD: 1.22 CM
BH CV ECHO MEAS - MED PEAK E' VEL: 5 CM/SEC
BH CV ECHO MEAS - MV A MAX VEL: 112.7 CM/SEC
BH CV ECHO MEAS - MV DEC TIME: 0.41 SEC
BH CV ECHO MEAS - MV E MAX VEL: 82 CM/SEC
BH CV ECHO MEAS - MV E/A: 0.73
BH CV ECHO MEAS - RVDD: 3.2 CM
BH CV ECHO MEAS - SV(MOD-SP4): 67.8 ML
BH CV ECHO MEAS - SVI(MOD-SP4): 35.7 ML/M2
BH CV ECHO MEASUREMENTS AVERAGE E/E' RATIO: 14.64
BH CV REST NUCLEAR ISOTOPE DOSE: 10 MCI
BH CV STRESS COMMENTS STAGE 1: NORMAL
BH CV STRESS DOSE REGADENOSON STAGE 1: 0.4
BH CV STRESS DURATION MIN STAGE 1: 0
BH CV STRESS DURATION SEC STAGE 1: 10
BH CV STRESS NUCLEAR ISOTOPE DOSE: 30 MCI
BH CV STRESS PROTOCOL 1: NORMAL
BH CV STRESS RECOVERY BP: NORMAL MMHG
BH CV STRESS RECOVERY HR: 69 BPM
BH CV STRESS STAGE 1: 1
LEFT ATRIUM VOLUME INDEX: 24.2 ML/M2
MAXIMAL PREDICTED HEART RATE: 142 BPM
PERCENT MAX PREDICTED HR: 59.86 %
STRESS BASELINE BP: NORMAL MMHG
STRESS BASELINE HR: 61 BPM
STRESS PERCENT HR: 70 %
STRESS POST PEAK BP: NORMAL MMHG
STRESS POST PEAK HR: 85 BPM
STRESS TARGET HR: 121 BPM

## 2024-10-01 PROCEDURE — 93017 CV STRESS TEST TRACING ONLY: CPT

## 2024-10-01 PROCEDURE — 93306 TTE W/DOPPLER COMPLETE: CPT | Performed by: INTERNAL MEDICINE

## 2024-10-01 PROCEDURE — A9500 TC99M SESTAMIBI: HCPCS | Performed by: INTERNAL MEDICINE

## 2024-10-01 PROCEDURE — 0 TECHNETIUM SESTAMIBI: Performed by: INTERNAL MEDICINE

## 2024-10-01 PROCEDURE — 93018 CV STRESS TEST I&R ONLY: CPT | Performed by: INTERNAL MEDICINE

## 2024-10-01 PROCEDURE — 25010000002 REGADENOSON 0.4 MG/5ML SOLUTION: Performed by: INTERNAL MEDICINE

## 2024-10-01 PROCEDURE — 93306 TTE W/DOPPLER COMPLETE: CPT

## 2024-10-01 PROCEDURE — 78452 HT MUSCLE IMAGE SPECT MULT: CPT

## 2024-10-01 PROCEDURE — 78452 HT MUSCLE IMAGE SPECT MULT: CPT | Performed by: INTERNAL MEDICINE

## 2024-10-01 RX ORDER — REGADENOSON 0.08 MG/ML
0.4 INJECTION, SOLUTION INTRAVENOUS
Status: COMPLETED | OUTPATIENT
Start: 2024-10-01 | End: 2024-10-01

## 2024-10-01 RX ADMIN — TECHNETIUM TC 99M SESTAMIBI 1 DOSE: 1 INJECTION INTRAVENOUS at 10:12

## 2024-10-01 RX ADMIN — REGADENOSON 0.4 MG: 0.08 INJECTION, SOLUTION INTRAVENOUS at 10:12

## 2024-10-01 RX ADMIN — TECHNETIUM TC 99M SESTAMIBI 1 DOSE: 1 INJECTION INTRAVENOUS at 09:33

## 2024-10-02 ENCOUNTER — TELEPHONE (OUTPATIENT)
Dept: CARDIOLOGY | Facility: CLINIC | Age: 79
End: 2024-10-02
Payer: MEDICARE

## 2024-10-02 NOTE — TELEPHONE ENCOUNTER
First attempt to reach pt. Pt has no voicemail set up.  Emergency contact's # is disconnected.       RELAY      Echo was normal, but there was an area of concern on stress, we want to get her in for a sooner appt to discuss results in depth.   (Transfer to  staff!)

## 2024-10-02 NOTE — TELEPHONE ENCOUNTER
----- Message from Rola Manzo sent at 10/2/2024  8:13 AM EDT -----  Concerning area on stress.  Follow up 1-2 weeks.         Rola Manzo, Dina Calhoun  EF 55-60%, grade 1 diastolic dysfunction, mild AI

## 2024-10-04 NOTE — TELEPHONE ENCOUNTER
Reached pt, informed her of abn result and need for appt.     Spoke w/ Alex and he was setting her up w/ appt, as she's hard to reach via telephone.

## 2024-10-09 LAB
MAXIMAL PREDICTED HEART RATE: 149 BPM
STRESS TARGET HR: 127 BPM

## 2024-10-17 ENCOUNTER — OFFICE VISIT (OUTPATIENT)
Dept: CARDIOLOGY | Facility: CLINIC | Age: 79
End: 2024-10-17
Payer: MEDICARE

## 2024-10-17 VITALS
HEIGHT: 64 IN | WEIGHT: 193.2 LBS | SYSTOLIC BLOOD PRESSURE: 141 MMHG | BODY MASS INDEX: 32.98 KG/M2 | HEART RATE: 63 BPM | DIASTOLIC BLOOD PRESSURE: 88 MMHG | OXYGEN SATURATION: 95 %

## 2024-10-17 DIAGNOSIS — R07.2 PRECORDIAL PAIN: ICD-10-CM

## 2024-10-17 DIAGNOSIS — R06.02 SHORTNESS OF BREATH: ICD-10-CM

## 2024-10-17 DIAGNOSIS — R94.39 ABNORMAL NUCLEAR STRESS TEST: Primary | ICD-10-CM

## 2024-10-17 DIAGNOSIS — E78.5 DYSLIPIDEMIA: ICD-10-CM

## 2024-10-17 PROCEDURE — 3077F SYST BP >= 140 MM HG: CPT | Performed by: CLINICAL NURSE SPECIALIST

## 2024-10-17 PROCEDURE — 99214 OFFICE O/P EST MOD 30 MIN: CPT | Performed by: CLINICAL NURSE SPECIALIST

## 2024-10-17 PROCEDURE — 3079F DIAST BP 80-89 MM HG: CPT | Performed by: CLINICAL NURSE SPECIALIST

## 2024-10-17 RX ORDER — PANCRELIPASE 36000; 180000; 114000 [USP'U]/1; [USP'U]/1; [USP'U]/1
CAPSULE, DELAYED RELEASE PELLETS ORAL
COMMUNITY

## 2024-10-17 NOTE — PROGRESS NOTES
Subjective     Jennifer Vidal is a 79 y.o. female who presents today f to discuss test results.  Follow-up (2mth).    CHIEF COMPLIANT  Chief Complaint   Patient presents with    Follow-up     2mth   Chief complaint: Discuss abnormal testing, ongoing dyspnea    Active Problems:  1.  Chest pain  1.1 stress test April 2018 demonstrates no evidence of ischemia and preserved LV function  1. Nuclear stress 10/1/24: Scintigraphy demonstrates a moderately sized, mild to moderately dense predominantly reversible defect involving the inferobasilar and diaphragmatic segments as well as the more basilar portions of the inferolateral wall. Findings are felt compatible with ischemia.   2.  Mild aortic and mitral insufficiency  3.  Preserved systolic function  3.1 Echocardiogram 10/1/24: ejection fraction is 55 to 60%. Grade 1A diastolic dysfunction.   4.  Hypertension  5.  Dyslipidemia  6.  Type 2 diabetes mellitus  7.  Untreated sleep apnea because of patient noncompliance    HPI  The patient is a 79-year-old female that returns for follow-up.  At her last visit she was having intermittent chest pain and had noted increasing dyspnea on exertion.  She was scheduled for a nuclear stress test which was completed on 10/1/2024 which demonstrates a moderately sized, mild to moderately dense predominantly reversible defect involving the inferobasilar and diaphragmatic segments as well as the more basilar portions of the inferolateral wall. Findings are felt compatible with ischemia.  Patient has had no further chest pain since her last visit but does continue to have increasing dyspnea with exertion.  She denies syncope or near syncope.  She denies significant palpitations.    PRIOR MEDS  Current Outpatient Medications on File Prior to Visit   Medication Sig Dispense Refill    allopurinol (ZYLOPRIM) 100 MG tablet Take 1 tablet by mouth Daily.      aspirin 81 MG tablet Take 1 tablet by mouth Daily.      Cholecalciferol 10 MCG (400  UNIT) tablet Take 1 tablet by mouth Daily.      escitalopram (LEXAPRO) 10 MG tablet Take 1 tablet by mouth Daily.      Fluticasone Furoate-Vilanterol (BREO ELLIPTA) 100-25 MCG/INH aerosol powder  Inhale Daily.      hydrochlorothiazide (HYDRODIURIL) 25 MG tablet Take 1 tablet by mouth Daily.      ibuprofen (ADVIL,MOTRIN) 800 MG tablet Take 1 tablet by mouth Every 6 (Six) Hours As Needed for Mild Pain.      lisinopril (PRINIVIL,ZESTRIL) 20 MG tablet Take 1 tablet by mouth Daily.      metFORMIN (GLUCOPHAGE) 500 MG tablet Take 1 tablet by mouth 2 (Two) Times a Day With Meals.      metoprolol tartrate (LOPRESSOR) 50 MG tablet Take 0.5 tablets by mouth Daily.      nitroglycerin (NITROSTAT) 0.4 MG SL tablet Place 1 tablet under the tongue As Needed for Chest Pain. Take no more than 3 doses in 15 minutes.      omeprazole (priLOSEC) 20 MG capsule Take 1 capsule by mouth Daily.      orphenadrine (NORFLEX) 100 MG 12 hr tablet Take 1 tablet by mouth 2 (Two) Times a Day.      PARoxetine (PAXIL) 40 MG tablet Take 1 tablet by mouth Every Morning.      simvastatin (ZOCOR) 40 MG tablet Take 1 tablet by mouth Every Night.      traMADol (ULTRAM) 50 MG tablet Take 1 tablet by mouth Every 8 (Eight) Hours As Needed for Moderate Pain.      traZODone (DESYREL) 100 MG tablet Take 1 tablet by mouth Every Night.      Creon [id number] units capsule delayed-release particles capsule       metoprolol tartrate (LOPRESSOR) 100 MG tablet TAKE 1 TABLET BY MOUTH ONE HOUR PRIOR TO CTA APPOINTMENT, AND TAKE 1 TABLET TO CTA APPOINTMENT 2 tablet 0     No current facility-administered medications on file prior to visit.       ALLERGIES  Codeine and Sulfa antibiotics    HISTORY  Past Medical History:   Diagnosis Date    Diabetes mellitus     Hyperlipidemia     Hypertension        Social History     Socioeconomic History    Marital status: Unknown   Tobacco Use    Smoking status: Never    Smokeless tobacco: Never   Vaping Use    Vaping status: Never Used  "  Substance and Sexual Activity    Alcohol use: No    Drug use: No    Sexual activity: Defer       Family History   Problem Relation Age of Onset    COPD Mother     Heart failure Mother     Heart attack Father     Diabetes Father        Review of Systems   Constitutional:  Positive for fatigue.   HENT:  Positive for hearing loss. Negative for congestion, rhinorrhea (Improved) and sore throat.    Eyes:  Positive for visual disturbance (Glasses daily).   Respiratory:  Positive for chest tightness (w/ overexhertion) and shortness of breath.    Cardiovascular:  Positive for palpitations (Every once and a while) and leg swelling (BLE edema, sometimes goes down overnight). Negative for chest pain.   Gastrointestinal: Negative.    Genitourinary: Negative.    Neurological:  Positive for dizziness (\"once and a while\" if she stands too fast) and headaches. Negative for syncope and numbness.   Hematological:  Bruises/bleeds easily.   Psychiatric/Behavioral:  Positive for sleep disturbance (States she sleeps a few hours, then is up- very broken sleep. Occasionally \"but not often\" wakes up SoB or gasping).        Objective     VITALS: /88   Pulse 63   Ht 162.6 cm (64\")   Wt 87.6 kg (193 lb 3.2 oz)   SpO2 95%   BMI 33.16 kg/m²     LABS:   Lab Results (most recent)       None            IMAGING:   No Images in the past 120 days found..    EXAM:    Constitutional:       Appearance: Normal appearance.   Eyes:      Pupils: Pupils are equal, round, and reactive to light.   Cardiovascular:      Rate and Rhythm: Normal rate and regular rhythm.      Pulses:           Carotid pulses are 2+ on the right side and 2+ on the left side.       Radial pulses are 2+ on the right side and 2+ on the left side.        Dorsalis pedis pulses are 2+ on the right side and 2+ on the left side.        Posterior tibial pulses are 2+ on the right side and 2+ on the left side.      Heart sounds: Normal heart sounds.   Pulmonary:      Effort: " Pulmonary effort is normal.      Breath sounds: Normal breath sounds.   Abdominal:      General: Bowel sounds are normal.      Palpations: Abdomen is soft.   Musculoskeletal:      Right lower leg: No edema.      Left lower leg: No edema.   Skin:     General: Skin is warm and dry.      Capillary Refill: Capillary refill takes less than 2 seconds.   Neurological:      General: No focal deficit present.      Mental Status: She is alert and oriented to person, place, and time.   Psychiatric:         Mood and Affect: Mood normal.         Thought Content: Thought content normal.      Procedure   Procedures       Assessment & Plan    Diagnosis Plan   1. Abnormal nuclear stress test  [facility]    Comprehensive Metabolic Panel    Lipid Panel    CBC & Differential      2. Precordial pain  [facility]      3. Shortness of breath  [facility]      4. Dyslipidemia  Lipid Panel        Plan:  1.  Abnormal nuclear stress test: Due to stress test findings and ongoing symptoms will proceed with left cardiac catheterization.  Risks, benefits and alternatives discussed with the patient.  Risks including but not limited to reaction to contrast dye, acute kidney injury, bleeding at cath site, damage to arteries, heart or area where catheter inserted, infection, heart attack, stroke, blood clots,  arrhythmia.  Patient verbalizes understanding and wishes to proceed.    Will plan to see the patient back after procedure for ongoing evaluation.  Continue aspirin and statin.  Will check a lipid panel with precath labs.  2.  Precordial pain: We will proceed with cath as discussed above.  The patient does have sublingual nitroglycerin to use if needed.  We did review instructions on appropriate use and when to seek emergency treatment.  3.  Dyspnea: Proceed with left cardiac catheterization.    4.  CAD: Patient has known nonobstructive disease from several years ago.  Continue medical management.  Will proceed with  testing as described above.  5.  Essential hypertension: Blood pressure under good control current medication regimen.  The patient was instructed to monitor BP at home and to notify our office if systolic BP is consistently greater than 130-135 systolic.  Goal BP is 130-135/70-80.  6.  Dyslipidemia: Continue statin.  Will schedule a repeat lipid panel.  Return for 2 weeks after heart cath .    Jennifer Vidal  reports that she has never smoked. She has never used smokeless tobacco.    BMI is >= 30 and <35. (Class 1 Obesity). The following options were offered after discussion;: referral to primary care    Advance Care Planning   ACP discussion was declined by the patient. Patient does not have an advance directive, declines further assistance.           MEDS ORDERED DURING VISIT:  No orders of the defined types were placed in this encounter.      DISCONTINUED MEDS DURING VISIT:   There are no discontinued medications.       This document has been electronically signed by SHEN Benson  October 17, 2024 13:07 EDT    Dictated Utilizing Dragon Dictation: Part of this note may be an electronic transcription/translation of spoken language to printed text using the Dragon Dictation System

## 2024-10-21 ENCOUNTER — TELEPHONE (OUTPATIENT)
Dept: CARDIOLOGY | Facility: CLINIC | Age: 79
End: 2024-10-21
Payer: MEDICARE

## 2024-10-21 NOTE — TELEPHONE ENCOUNTER
Her stress showed changes in the inferobasilar and diaphragmatic segments as well as the more basilar portions of the inferolateral wall compatible with ischemia.  We will not know the information she is asking until the heart cath is completed.

## 2024-10-21 NOTE — TELEPHONE ENCOUNTER
Heart cath in November. She would like to know where the block age is located, what percentage is blocked.

## 2024-11-14 ENCOUNTER — TELEPHONE (OUTPATIENT)
Dept: CARDIOLOGY | Facility: CLINIC | Age: 79
End: 2024-11-14
Payer: MEDICARE

## 2024-11-14 NOTE — TELEPHONE ENCOUNTER
Auth requested for cath scheduled tomorrow, 11/15. Advised cath has been moved to 12/6/2024. Auth and packet will be faxed closer to procedure date.    ----- Message from Vin RUIZ sent at 11/14/2024  2:52 PM EST -----  Regarding: [facility] INSURANCE VERIFICATION CALLING REGARDING UPCOMING PT TESTING  [facility] INSURANCE VERIFICATION CALLING REGARDING UPCOMING PT TESTING. PLEASE CALL BACK TIANA [phone].

## 2024-11-18 ENCOUNTER — TELEPHONE (OUTPATIENT)
Dept: CARDIOLOGY | Facility: CLINIC | Age: 79
End: 2024-11-18
Payer: MEDICARE

## 2024-11-18 NOTE — TELEPHONE ENCOUNTER
11/18 UNABLE TO LVM TO RESCHEDULE CATH THAT IS SCHEDULED. PLEASE TRANSFER TO REJI WHEN RETURNING CALL.

## 2024-12-30 DIAGNOSIS — R07.2 PRECORDIAL PAIN: ICD-10-CM

## 2024-12-30 DIAGNOSIS — R94.39 ABNORMAL NUCLEAR STRESS TEST: ICD-10-CM

## 2024-12-30 DIAGNOSIS — R06.02 SHORTNESS OF BREATH: ICD-10-CM

## 2025-01-17 ENCOUNTER — OFFICE VISIT (OUTPATIENT)
Dept: CARDIOLOGY | Facility: CLINIC | Age: 80
End: 2025-01-17
Payer: MEDICARE

## 2025-01-17 VITALS
HEIGHT: 64 IN | BODY MASS INDEX: 33.16 KG/M2 | OXYGEN SATURATION: 96 % | HEART RATE: 61 BPM | DIASTOLIC BLOOD PRESSURE: 78 MMHG | SYSTOLIC BLOOD PRESSURE: 149 MMHG

## 2025-01-17 DIAGNOSIS — G47.10 HYPERSOMNOLENCE: ICD-10-CM

## 2025-01-17 DIAGNOSIS — R07.2 PRECORDIAL PAIN: ICD-10-CM

## 2025-01-17 DIAGNOSIS — R53.83 OTHER FATIGUE: ICD-10-CM

## 2025-01-17 DIAGNOSIS — E78.5 DYSLIPIDEMIA: ICD-10-CM

## 2025-01-17 DIAGNOSIS — I25.10 CORONARY ARTERY DISEASE INVOLVING NATIVE CORONARY ARTERY OF NATIVE HEART WITHOUT ANGINA PECTORIS: Primary | ICD-10-CM

## 2025-01-17 DIAGNOSIS — R06.83 SNORING: ICD-10-CM

## 2025-01-17 DIAGNOSIS — I10 ESSENTIAL HYPERTENSION: ICD-10-CM

## 2025-01-17 PROCEDURE — 3077F SYST BP >= 140 MM HG: CPT | Performed by: CLINICAL NURSE SPECIALIST

## 2025-01-17 PROCEDURE — 3078F DIAST BP <80 MM HG: CPT | Performed by: CLINICAL NURSE SPECIALIST

## 2025-01-17 PROCEDURE — 99214 OFFICE O/P EST MOD 30 MIN: CPT | Performed by: CLINICAL NURSE SPECIALIST

## 2025-01-17 RX ORDER — ISOSORBIDE MONONITRATE 30 MG/1
30 TABLET, EXTENDED RELEASE ORAL EVERY MORNING
Qty: 90 TABLET | Refills: 3 | Status: SHIPPED | OUTPATIENT
Start: 2025-01-17

## 2025-01-17 NOTE — PROGRESS NOTES
Subjective     Jennifer Vidal is a 79 y.o. female who presents today for Follow-up ([facility]).    CHIEF COMPLIANT  Chief Complaint   Patient presents with    Follow-up     [facility]       Active Problems:  1.  Chest pain  1.1 stress test April 2018 demonstrates no evidence of ischemia and preserved LV function  1.2 Nuclear stress 10/1/24: Scintigraphy demonstrates a moderately sized, mild to moderately dense predominantly reversible defect involving the inferobasilar and diaphragmatic segments as well as the more basilar portions of the inferolateral wall. Findings are felt compatible with ischemia.   1.3 LHC 12/20/24: LAD mid 50%, FFR 0.98.  EF 55%   2.  Mild aortic and mitral insufficiency  3.  Preserved systolic function  3.1 Echocardiogram 10/1/24: ejection fraction is 55 to 60%. Grade 1A diastolic dysfunction.   4.  Hypertension  5.  Dyslipidemia  6.  Type 2 diabetes mellitus      HPI  The patient is a 79 year old female that returns for follow up after recent C.  She underwent LHC on 12/20/24 which showed mid LAD 50%, FFR 0.98, EF 55%.  She has continued to have intermittent chest pain which occurs randomly.  Her major concern is increased fatigue.  She states that she is so tired she does not feel like doing anything.  She states she wakes up in the morning feeling as tired as she did when she goes to bed.  She states if she does not keep herself busy she will fall asleep during the day easily.  She denies syncope or near syncope.  She denies worsening dyspnea.    PRIOR MEDS  Current Outpatient Medications on File Prior to Visit   Medication Sig Dispense Refill    allopurinol (ZYLOPRIM) 100 MG tablet Take 1 tablet by mouth Daily.      aspirin 81 MG tablet Take 1 tablet by mouth Daily.      Cholecalciferol 10 MCG (400 UNIT) tablet Take 1 tablet by mouth Daily.      Creon [id number] units capsule delayed-release particles capsule       escitalopram (LEXAPRO) 10 MG tablet Take 1 tablet by mouth Daily.       Fluticasone Furoate-Vilanterol (BREO ELLIPTA) 100-25 MCG/INH aerosol powder  Inhale Daily.      hydrochlorothiazide (HYDRODIURIL) 25 MG tablet Take 1 tablet by mouth Daily.      ibuprofen (ADVIL,MOTRIN) 800 MG tablet Take 1 tablet by mouth Every 6 (Six) Hours As Needed for Mild Pain.      lisinopril (PRINIVIL,ZESTRIL) 20 MG tablet Take 1 tablet by mouth Daily.      metFORMIN (GLUCOPHAGE) 500 MG tablet Take 1 tablet by mouth 2 (Two) Times a Day With Meals.      metoprolol tartrate (LOPRESSOR) 100 MG tablet TAKE 1 TABLET BY MOUTH ONE HOUR PRIOR TO CTA APPOINTMENT, AND TAKE 1 TABLET TO CTA APPOINTMENT 2 tablet 0    metoprolol tartrate (LOPRESSOR) 50 MG tablet Take 0.5 tablets by mouth Daily.      nitroglycerin (NITROSTAT) 0.4 MG SL tablet Place 1 tablet under the tongue As Needed for Chest Pain. Take no more than 3 doses in 15 minutes.      omeprazole (priLOSEC) 20 MG capsule Take 1 capsule by mouth Daily.      orphenadrine (NORFLEX) 100 MG 12 hr tablet Take 1 tablet by mouth 2 (Two) Times a Day.      PARoxetine (PAXIL) 40 MG tablet Take 1 tablet by mouth Every Morning.      simvastatin (ZOCOR) 40 MG tablet Take 1 tablet by mouth Every Night.      traMADol (ULTRAM) 50 MG tablet Take 1 tablet by mouth Every 8 (Eight) Hours As Needed for Moderate Pain.      traZODone (DESYREL) 100 MG tablet Take 1 tablet by mouth Every Night.       No current facility-administered medications on file prior to visit.       ALLERGIES  Codeine and Sulfa antibiotics    HISTORY  Past Medical History:   Diagnosis Date    Diabetes mellitus     Hyperlipidemia     Hypertension        Social History     Socioeconomic History    Marital status: Unknown   Tobacco Use    Smoking status: Never    Smokeless tobacco: Never   Vaping Use    Vaping status: Never Used   Substance and Sexual Activity    Alcohol use: No    Drug use: No    Sexual activity: Defer       Family History   Problem Relation Age of Onset    COPD Mother     Heart failure Mother      "Heart attack Father     Diabetes Father        Review of Systems   Constitutional:  Positive for fatigue (States some days she feels better and some days she feels worn out and doesn't know why.).   Eyes:  Positive for visual disturbance (Glasses).   Respiratory:  Positive for shortness of breath. Negative for chest tightness.    Cardiovascular:  Positive for palpitations (States her heart slows down sometimes) and leg swelling (RLE). Negative for chest pain.   Neurological:  Positive for weakness (Generalized) and headaches. Negative for dizziness, syncope and numbness.   Hematological:  Does not bruise/bleed easily.   Psychiatric/Behavioral:  Positive for sleep disturbance (Off and on sleep issues. States some nights she stays up all night just sitting there.).        Objective     VITALS: /78   Pulse 61   Ht 162.6 cm (64\")   SpO2 96%   BMI 33.16 kg/m²     LABS:   Lab Results (most recent)       None            IMAGING:   No Images in the past 120 days found..    EXAM:  Physical Exam  Constitutional:       Appearance: Normal appearance.   Eyes:      Pupils: Pupils are equal, round, and reactive to light.   Cardiovascular:      Rate and Rhythm: Normal rate and regular rhythm.      Pulses:           Carotid pulses are 2+ on the right side and 2+ on the left side.       Radial pulses are 2+ on the right side and 2+ on the left side.        Dorsalis pedis pulses are 2+ on the right side and 2+ on the left side.        Posterior tibial pulses are 2+ on the right side and 2+ on the left side.      Heart sounds: Normal heart sounds.   Pulmonary:      Effort: Pulmonary effort is normal.      Breath sounds: Normal breath sounds.   Abdominal:      General: Bowel sounds are normal.      Palpations: Abdomen is soft.   Musculoskeletal:      Right lower leg: No edema.      Left lower leg: No edema.   Skin:     General: Skin is warm and dry.      Capillary Refill: Capillary refill takes less than 2 seconds.      " Comments: Bruising at cath site   Neurological:      General: No focal deficit present.      Mental Status: She is alert and oriented to person, place, and time.   Psychiatric:         Mood and Affect: Mood normal.         Thought Content: Thought content normal.         Procedure   Procedures       Assessment & Plan    Diagnosis Plan   1. Coronary artery disease involving native coronary artery of native heart without angina pectoris        2. Precordial pain        3. Essential hypertension        4. Dyslipidemia        5. Other fatigue  Home Sleep Study      6. Hypersomnolence  Home Sleep Study      7. Snoring  Home Sleep Study        Plan:  1.  CAD: Nonobstructive LAD disease found on cath.  Will continue medical management.  Continue aspirin, statin, beta-blocker, lisinopril.  She does have some continued chest pain symptoms.  Will add isosorbide.  She does have sublingual nitroglycerin to use if needed.  Instructions on appropriate use and when to seek emergency treatment.  2.  Precordial pain: Cath showed nonobstructive disease.  She does have some continued chest pain symptoms.  Will add isosorbide.  Will see her back to evaluate effectiveness.  3.  Essential hypertension: Blood pressure borderline elevated in the office today.  It has been a little elevated at her last visit as well.  Will continue current antihypertensives.  We are adding isosorbide.  The patient was instructed to monitor BP at home and to notify our office if systolic BP is consistently greater than 130-135 systolic.  Goal BP is 130-135/70-80.  Will continue to adjust medications as needed.  4.  Dyslipidemia: Continue statin.  Will periodically review lipid panel.  5.  The patient has symptoms concerning for obstructive sleep apnea including fatigue, hypersomnolence and snoring.  Will order a home sleep study to evaluate for obstructive sleep apnea.    Return in about 2 months (around 3/17/2025).    Jennifer Vidal  reports that she has  never smoked. She has never used smokeless tobacco.        Advance Care Planning   ACP discussion was declined by the patient. Patient does not have an advance directive, declines further assistance.             MEDS ORDERED DURING VISIT:  New Medications Ordered This Visit   Medications    isosorbide mononitrate (IMDUR) 30 MG 24 hr tablet     Sig: Take 1 tablet by mouth Every Morning.     Dispense:  90 tablet     Refill:  3       DISCONTINUED MEDS DURING VISIT:   There are no discontinued medications.       This document has been electronically signed by SHEN Benson  January 17, 2025 09:02 EST    Dictated Utilizing Dragon Dictation: Part of this note may be an electronic transcription/translation of spoken language to printed text using the Dragon Dictation System

## 2025-02-03 ENCOUNTER — TELEPHONE (OUTPATIENT)
Dept: CARDIOLOGY | Facility: CLINIC | Age: 80
End: 2025-02-03
Payer: MEDICARE

## 2025-02-03 NOTE — TELEPHONE ENCOUNTER
Called pt, she states that Rola sent her to Dr. Nguyễn and she doesn't want CPAP. I explained that they can discuss other options w/ her like surgery or sometimes they do mouth piece. Pt stated that she doesn't want to do CPAP or see pulm at all.

## 2025-02-03 NOTE — TELEPHONE ENCOUNTER
"Pt LVM stating she needs a call from manuela about something that was discussed at last Ov. No additional info was left.       Per chart review, OV note from 1/17/25 states:  \"We are adding isosorbide. The patient was instructed to monitor BP at home and to notify our office if systolic BP is consistently greater than 130-135 systolic. Goal BP is 130-135/70-80. Will continue to adjust medications as needed. \"  "

## 2025-03-06 ENCOUNTER — TELEPHONE (OUTPATIENT)
Dept: CARDIOLOGY | Facility: CLINIC | Age: 80
End: 2025-03-06
Payer: MEDICARE

## 2025-03-06 NOTE — TELEPHONE ENCOUNTER
Per chart review, in my last encounter w/ pt, she did not want CPAP if it was needed or pulm referral.       Called and confirmed w/ pt that she is not interested in completing sleep study or anything related to pulm or sleep at this time.

## 2025-03-06 NOTE — TELEPHONE ENCOUNTER
----- Message from Rola Manzo sent at 3/3/2025  5:09 PM EST -----  Inadequate study.  Repeat study recommended.

## 2025-03-24 ENCOUNTER — OFFICE VISIT (OUTPATIENT)
Dept: CARDIOLOGY | Facility: CLINIC | Age: 80
End: 2025-03-24
Payer: MEDICARE

## 2025-03-24 ENCOUNTER — TELEPHONE (OUTPATIENT)
Dept: CARDIOLOGY | Facility: CLINIC | Age: 80
End: 2025-03-24

## 2025-03-24 VITALS
BODY MASS INDEX: 32.85 KG/M2 | DIASTOLIC BLOOD PRESSURE: 71 MMHG | OXYGEN SATURATION: 94 % | HEART RATE: 57 BPM | WEIGHT: 192.4 LBS | SYSTOLIC BLOOD PRESSURE: 113 MMHG | HEIGHT: 64 IN

## 2025-03-24 DIAGNOSIS — R07.2 PRECORDIAL PAIN: ICD-10-CM

## 2025-03-24 DIAGNOSIS — E78.5 DYSLIPIDEMIA: ICD-10-CM

## 2025-03-24 DIAGNOSIS — I25.10 CORONARY ARTERY DISEASE INVOLVING NATIVE CORONARY ARTERY OF NATIVE HEART WITHOUT ANGINA PECTORIS: Primary | ICD-10-CM

## 2025-03-24 DIAGNOSIS — I10 ESSENTIAL HYPERTENSION: ICD-10-CM

## 2025-03-24 DIAGNOSIS — I10 ESSENTIAL HYPERTENSION: Primary | ICD-10-CM

## 2025-03-24 DIAGNOSIS — R42 DIZZINESS: ICD-10-CM

## 2025-03-24 PROCEDURE — 3078F DIAST BP <80 MM HG: CPT | Performed by: CLINICAL NURSE SPECIALIST

## 2025-03-24 PROCEDURE — 99214 OFFICE O/P EST MOD 30 MIN: CPT | Performed by: CLINICAL NURSE SPECIALIST

## 2025-03-24 PROCEDURE — 3074F SYST BP LT 130 MM HG: CPT | Performed by: CLINICAL NURSE SPECIALIST

## 2025-03-24 RX ORDER — MECLIZINE HYDROCHLORIDE 25 MG/1
25 TABLET ORAL 2 TIMES DAILY PRN
Qty: 60 TABLET | Refills: 3 | Status: SHIPPED | OUTPATIENT
Start: 2025-03-24

## 2025-03-24 RX ORDER — AMLODIPINE BESYLATE 5 MG/1
5 TABLET ORAL DAILY
COMMUNITY

## 2025-03-24 RX ORDER — FUROSEMIDE 40 MG/1
40 TABLET ORAL
COMMUNITY

## 2025-03-24 RX ORDER — ROSUVASTATIN CALCIUM 20 MG/1
20 TABLET, COATED ORAL DAILY
Qty: 30 TABLET | Refills: 11 | Status: SHIPPED | OUTPATIENT
Start: 2025-03-24

## 2025-03-24 RX ORDER — RANOLAZINE 500 MG/1
500 TABLET, EXTENDED RELEASE ORAL 2 TIMES DAILY
Qty: 60 TABLET | Refills: 11 | Status: SHIPPED | OUTPATIENT
Start: 2025-03-24

## 2025-03-24 NOTE — TELEPHONE ENCOUNTER
I would like to change her to rosuvastatin 20mg daily.  Recheck a lipid panel and hepatic function panel in 3 months.

## 2025-03-24 NOTE — TELEPHONE ENCOUNTER
Kisha from [facility] called to report the Ranolazine interacts with the patient's simvastatin 40 mg and they recommend the patient dose be changed to simvastatin 20 mg. Please advise. Pharmacy phone number [phone].

## 2025-03-24 NOTE — PROGRESS NOTES
Subjective     Jennifer Vidal is a 79 y.o. female who presents today for Follow-up (2 mths f/up).    CHIEF COMPLIANT  Chief Complaint   Patient presents with    Follow-up     2 mths f/up       Active Problems:  1.  Chest pain  1.1 stress test April 2018 demonstrates no evidence of ischemia and preserved LV function  1.2 Nuclear stress 10/1/24: Scintigraphy demonstrates a moderately sized, mild to moderately dense predominantly reversible defect involving the inferobasilar and diaphragmatic segments as well as the more basilar portions of the inferolateral wall. Findings are felt compatible with ischemia.   1.3 LHC 12/20/24: LAD mid 50%, FFR 0.98.  EF 55%   2.  Mild aortic and mitral insufficiency  3.  Preserved systolic function  3.1 Echocardiogram 10/1/24: ejection fraction is 55 to 60%. Grade 1A diastolic dysfunction.   4.  Hypertension  5.  Dyslipidemia  6.  Type 2 diabetes mellitus    HPI  The patient is a 79-year-old female that returns for follow-up.  She did have a left cardiac catheterization in December that showed nonobstructive LAD disease, mid 50% with FFR 0.98.  At her last visit she was still having intermittent chest pain and was started on isosorbide.  She does feel like this has helped her symptoms but has noted increased orthostatic dizziness since starting this medication.  She states she is also having another type of dizziness where it looks like the room is spinning.  She states she notices this more when she lays in bed but it also happens at times when she is up walking.  She denies syncope, near syncope or palpitations.  She denies worsening dyspnea.  She continues to have fatigue but did elect not to pursue further sleep study testing.   HR and BP stable today.       PRIOR MEDS  Current Outpatient Medications on File Prior to Visit   Medication Sig Dispense Refill    allopurinol (ZYLOPRIM) 100 MG tablet Take 1 tablet by mouth Daily.      amLODIPine (NORVASC) 5 MG tablet Take 1 tablet by  mouth Daily.      aspirin 81 MG tablet Take 1 tablet by mouth Daily.      Creon [id number] units capsule delayed-release particles capsule       DICLOFENAC PO Take 50 mg by mouth Daily.      escitalopram (LEXAPRO) 10 MG tablet Take 1 tablet by mouth Daily.      FOLIC ACID PO Take  by mouth.      furosemide (LASIX) 40 MG tablet Take 1 tablet by mouth. Just when needed      metoprolol tartrate (LOPRESSOR) 50 MG tablet Take 0.5 tablets by mouth Daily.      nitroglycerin (NITROSTAT) 0.4 MG SL tablet Place 1 tablet under the tongue As Needed for Chest Pain. Take no more than 3 doses in 15 minutes.      simvastatin (ZOCOR) 40 MG tablet Take 1 tablet by mouth Every Night.      traZODone (DESYREL) 100 MG tablet Take 1 tablet by mouth Every Night.      [DISCONTINUED] Cholecalciferol 10 MCG (400 UNIT) tablet Take 1 tablet by mouth Daily.      [DISCONTINUED] Fluticasone Furoate-Vilanterol (BREO ELLIPTA) 100-25 MCG/INH aerosol powder  Inhale Daily.      [DISCONTINUED] hydrochlorothiazide (HYDRODIURIL) 25 MG tablet Take 1 tablet by mouth Daily.      [DISCONTINUED] ibuprofen (ADVIL,MOTRIN) 800 MG tablet Take 1 tablet by mouth Every 6 (Six) Hours As Needed for Mild Pain.      [DISCONTINUED] isosorbide mononitrate (IMDUR) 30 MG 24 hr tablet Take 1 tablet by mouth Every Morning. 90 tablet 3    [DISCONTINUED] lisinopril (PRINIVIL,ZESTRIL) 20 MG tablet Take 1 tablet by mouth Daily.      [DISCONTINUED] metFORMIN (GLUCOPHAGE) 500 MG tablet Take 1 tablet by mouth 2 (Two) Times a Day With Meals.      [DISCONTINUED] metoprolol tartrate (LOPRESSOR) 100 MG tablet TAKE 1 TABLET BY MOUTH ONE HOUR PRIOR TO CTA APPOINTMENT, AND TAKE 1 TABLET TO CTA APPOINTMENT 2 tablet 0    [DISCONTINUED] omeprazole (priLOSEC) 20 MG capsule Take 1 capsule by mouth Daily.      [DISCONTINUED] orphenadrine (NORFLEX) 100 MG 12 hr tablet Take 1 tablet by mouth 2 (Two) Times a Day.      [DISCONTINUED] PARoxetine (PAXIL) 40 MG tablet Take 1 tablet by mouth Every  "Morning.      [DISCONTINUED] traMADol (ULTRAM) 50 MG tablet Take 1 tablet by mouth Every 8 (Eight) Hours As Needed for Moderate Pain.       No current facility-administered medications on file prior to visit.       ALLERGIES  Codeine and Sulfa antibiotics    HISTORY  Past Medical History:   Diagnosis Date    Diabetes mellitus     Hyperlipidemia     Hypertension        Social History     Socioeconomic History    Marital status: Unknown   Tobacco Use    Smoking status: Never    Smokeless tobacco: Never   Vaping Use    Vaping status: Never Used   Substance and Sexual Activity    Alcohol use: No    Drug use: No    Sexual activity: Defer       Family History   Problem Relation Age of Onset    COPD Mother     Heart failure Mother     Heart attack Father     Diabetes Father        Review of Systems   Constitutional:  Positive for fatigue.   Respiratory:  Positive for chest tightness (sometimes) and shortness of breath (with increased activity).    Cardiovascular:  Positive for chest pain (dull CP can feel it) and leg swelling (BLE does not go down). Negative for palpitations.   Neurological:  Positive for dizziness (every now and then) and headaches. Negative for syncope, weakness and numbness.   Hematological:  Does not bruise/bleed easily.   Psychiatric/Behavioral:  Positive for sleep disturbance.        Objective     VITALS: /71   Pulse 57   Ht 162.6 cm (64.02\")   Wt 87.3 kg (192 lb 6.4 oz)   SpO2 94%   BMI 33.01 kg/m²     LABS:   Lab Results (most recent)       None            IMAGING:   No Images in the past 120 days found..    EXAM:  Constitutional:       Appearance: Normal appearance.   Eyes:      Pupils: Pupils are equal, round, and reactive to light.   Cardiovascular:      Rate and Rhythm: Normal rate and regular rhythm.      Pulses:           Carotid pulses are 2+ on the right side and 2+ on the left side.       Radial pulses are 2+ on the right side and 2+ on the left side.        Dorsalis pedis " pulses are 2+ on the right side and 2+ on the left side.        Posterior tibial pulses are 2+ on the right side and 2+ on the left side.      Heart sounds: Normal heart sounds.   Pulmonary:      Effort: Pulmonary effort is normal.      Breath sounds: Normal breath sounds.   Abdominal:      General: Bowel sounds are normal.      Palpations: Abdomen is soft.   Musculoskeletal:      Right lower leg: No edema.      Left lower leg: No edema.   Skin:     General: Skin is warm and dry.      Capillary Refill: Capillary refill takes less than 2 seconds.     Neurological:      General: No focal deficit present.      Mental Status: She is alert and oriented to person, place, and time.   Psychiatric:         Mood and Affect: Mood normal.         Thought Content: Thought content normal.        Procedure   Procedures       Assessment & Plan    Diagnosis Plan   1. Coronary artery disease involving native coronary artery of native heart without angina pectoris  ranolazine (Ranexa) 500 MG 12 hr tablet      2. Precordial pain  ranolazine (Ranexa) 500 MG 12 hr tablet      3. Essential hypertension        4. Dyslipidemia        5. Dizziness  Ambulatory Referral to ENT (Otolaryngology)    meclizine (ANTIVERT) 25 MG tablet        Plan:  1.  CAD: Nonobstructive LAD disease found on cath.  Will continue medical management.  Continue aspirin, statin, beta-blocker, lisinopril.  She does have some continued chest pain symptoms which did improve with isosorbide but the isosorbide did cause orthostatic dizziness.  Will stop this and change the patient to ranexa 500mg PO BID.    She does have sublingual nitroglycerin to use if needed.  Instructions on appropriate use and when to seek emergency treatment. Will see her back to evaluate effectiveness of medication change and ongoing symptoms.  2.  Precordial pain: Isosorbide did improve symptoms but caused orthostatic dizziness.  Will stop this medication and changed to Ranexa as discussed above.   Will see her back to evaluate effectiveness.    3.  Essential hypertension: Blood pressure currently stable.  The patient was instructed to monitor BP at home and to notify our office if systolic BP is consistently greater than 130-135 systolic.  Goal BP is 130-135/70-80.  Will continue to adjust medications as needed.  4.  Dyslipidemia: Continue statin.  Will periodically review lipid panel.  5.  Dizziness: Concern for vertigo.  Will make referral to ENT.  In the meantime we will send in meclizine for the patient to have to use as needed as it could take a while for her to get in an appointment with ENT.  Return in about 3 months (around 6/24/2025).    Jennifer Vidal  reports that she has never smoked. She has never used smokeless tobacco.                Advance Care Planning   ACP discussion was held with the patient during this visit. Patient has an advance directive (not in EMR), copy requested.        MEDS ORDERED DURING VISIT:  New Medications Ordered This Visit   Medications    ranolazine (Ranexa) 500 MG 12 hr tablet     Sig: Take 1 tablet by mouth 2 (Two) Times a Day.     Dispense:  60 tablet     Refill:  11    meclizine (ANTIVERT) 25 MG tablet     Sig: Take 1 tablet by mouth 2 (Two) Times a Day As Needed for Dizziness.     Dispense:  60 tablet     Refill:  3       DISCONTINUED MEDS DURING VISIT:   Medications Discontinued During This Encounter   Medication Reason    traMADol (ULTRAM) 50 MG tablet Discontinued by another clinician    PARoxetine (PAXIL) 40 MG tablet Discontinued by another clinician    orphenadrine (NORFLEX) 100 MG 12 hr tablet Discontinued by another clinician    omeprazole (priLOSEC) 20 MG capsule Discontinued by another clinician    metoprolol tartrate (LOPRESSOR) 100 MG tablet Discontinued by another clinician    metFORMIN (GLUCOPHAGE) 500 MG tablet Discontinued by another clinician    lisinopril (PRINIVIL,ZESTRIL) 20 MG tablet Discontinued by another clinician    hydrochlorothiazide  (HYDRODIURIL) 25 MG tablet Discontinued by another clinician    ibuprofen (ADVIL,MOTRIN) 800 MG tablet Discontinued by another clinician    isosorbide mononitrate (IMDUR) 30 MG 24 hr tablet Discontinued by another clinician    Cholecalciferol 10 MCG (400 UNIT) tablet Discontinued by another clinician    Fluticasone Furoate-Vilanterol (BREO ELLIPTA) 100-25 MCG/INH aerosol powder  Discontinued by another clinician          This document has been electronically signed by SHEN Benson  March 24, 2025 12:50 EDT    Dictated Utilizing Dragon Dictation: Part of this note may be an electronic transcription/translation of spoken language to printed text using the Dragon Dictation System

## 2025-06-09 ENCOUNTER — LAB (OUTPATIENT)
Dept: LAB | Facility: HOSPITAL | Age: 80
End: 2025-06-09
Payer: MEDICARE

## 2025-06-09 ENCOUNTER — RESULTS FOLLOW-UP (OUTPATIENT)
Dept: CARDIOLOGY | Facility: CLINIC | Age: 80
End: 2025-06-09
Payer: MEDICARE

## 2025-06-09 DIAGNOSIS — I25.10 CORONARY ARTERY DISEASE INVOLVING NATIVE CORONARY ARTERY OF NATIVE HEART WITHOUT ANGINA PECTORIS: ICD-10-CM

## 2025-06-09 DIAGNOSIS — E78.5 DYSLIPIDEMIA: Primary | ICD-10-CM

## 2025-06-09 PROCEDURE — 80076 HEPATIC FUNCTION PANEL: CPT | Performed by: CLINICAL NURSE SPECIALIST

## 2025-06-09 PROCEDURE — 80061 LIPID PANEL: CPT | Performed by: CLINICAL NURSE SPECIALIST

## 2025-06-09 NOTE — LETTER
June 11, 2025     Jennifer Vidal  [address]  [place] [address]    Patient: Jennifer Vidal   YOB: 1945   Date of Visit: 6/9/2025     Dear Jennifer Vidal:       We have been unable to reach you via telephone after repeat attempts. We have been trying to get in touch with you about your lab results, please call our office at your earliest convenience to discuss.        Sincerely,      [facility] Staff

## 2025-06-09 NOTE — PROGRESS NOTES
LDL is at goal at 50.  Her HDL remains low.  I would refer to change her to rosuvastatin from simvastatin if she is willing.  If she is not, continue current medication.

## 2025-06-10 NOTE — TELEPHONE ENCOUNTER
First attempt to reach pt. No answer/no VM.       RELAY      HDL cholesterol remains low, Rola would like to change her from Rosuvastatin to simvastatin, if that's okay w/ pt. If not, continue current medicines.

## 2025-06-10 NOTE — TELEPHONE ENCOUNTER
Rola Manzo APRN to Me  (Selected Message)      6/9/25  5:19 PM  Note      LDL is at goal at 50.  Her HDL remains low.  I would refer to change her to rosuvastatin from simvastatin if she is willing.  If she is not, continue current medication.          Lipid Panel; Hepatic Function Panel

## 2025-06-16 RX ORDER — ROSUVASTATIN CALCIUM 40 MG/1
40 TABLET, COATED ORAL DAILY
Qty: 90 TABLET | Refills: 0 | Status: SHIPPED | OUTPATIENT
Start: 2025-06-16

## 2025-06-16 NOTE — TELEPHONE ENCOUNTER
Pt presented in office regd letter.     I spoke w/pt, she stated her phone is not working and is working on being fixed, stated it will be the same number.     I spoke w/ manuela, there was an error on pt's med list showing two statins. Pt is on Crestor, not Simvastatin. Per Manuela: increase Crestor dose to 40mg daily and recheck labs in 3 months.     I informed pt of the above, she verbalized understanding. I updated rx list.

## 2025-07-24 ENCOUNTER — OFFICE VISIT (OUTPATIENT)
Dept: CARDIOLOGY | Facility: CLINIC | Age: 80
End: 2025-07-24
Payer: MEDICARE

## 2025-07-24 VITALS
OXYGEN SATURATION: 95 % | BODY MASS INDEX: 30.66 KG/M2 | HEIGHT: 64 IN | DIASTOLIC BLOOD PRESSURE: 66 MMHG | SYSTOLIC BLOOD PRESSURE: 104 MMHG | HEART RATE: 61 BPM | WEIGHT: 179.6 LBS

## 2025-07-24 DIAGNOSIS — R07.2 PRECORDIAL PAIN: ICD-10-CM

## 2025-07-24 DIAGNOSIS — R06.02 SHORTNESS OF BREATH: ICD-10-CM

## 2025-07-24 DIAGNOSIS — E78.5 DYSLIPIDEMIA: ICD-10-CM

## 2025-07-24 DIAGNOSIS — I25.10 CORONARY ARTERY DISEASE INVOLVING NATIVE CORONARY ARTERY OF NATIVE HEART WITHOUT ANGINA PECTORIS: Primary | ICD-10-CM

## 2025-07-24 DIAGNOSIS — R53.83 OTHER FATIGUE: ICD-10-CM

## 2025-07-24 PROCEDURE — 3078F DIAST BP <80 MM HG: CPT | Performed by: NURSE PRACTITIONER

## 2025-07-24 PROCEDURE — 99214 OFFICE O/P EST MOD 30 MIN: CPT | Performed by: NURSE PRACTITIONER

## 2025-07-24 PROCEDURE — 1160F RVW MEDS BY RX/DR IN RCRD: CPT | Performed by: NURSE PRACTITIONER

## 2025-07-24 PROCEDURE — 3074F SYST BP LT 130 MM HG: CPT | Performed by: NURSE PRACTITIONER

## 2025-07-24 PROCEDURE — 1159F MED LIST DOCD IN RCRD: CPT | Performed by: NURSE PRACTITIONER

## 2025-07-24 NOTE — PROGRESS NOTES
Subjective     Jennifer Vidal is a 80 y.o. female who presents today for Follow-up (3mth).    CHIEF COMPLIANT  Chief Complaint   Patient presents with    Follow-up     3mth       Active Problems:  1.  Chest pain  1.1 stress test April 2018 demonstrates no evidence of ischemia and preserved LV function  1.2 Nuclear stress 10/1/24: Scintigraphy demonstrates a moderately sized, mild to moderately dense predominantly reversible defect involving the inferobasilar and diaphragmatic segments as well as the more basilar portions of the inferolateral wall. Findings are felt compatible with ischemia.   1.3 LHC 12/20/24: LAD mid 50%, FFR 0.98.  EF 55%   2.  Mild aortic and mitral insufficiency  3.  Preserved systolic function  3.1 Echocardiogram 10/1/24: ejection fraction is 55 to 60%. Grade 1A diastolic dysfunction.   4.  Hypertension  5.  Dyslipidemia  6.  Type 2 diabetes mellitus    HPI  Ms. Jennifer Vidal is a 80-year-old female patient who is being followed up today for coronary artery disease and chronic arterial hypertension.  Patient was also recently hospitalized due to norovirus.    Patient does have a history of chronic arterial hypertension in which she is being treated with amlodipine, metoprolol.  Today her blood pressure is 104/66 heart rate is 61.    Patient does have a history of coronary artery disease in which she did go under left cardiac catheterization in December that showed nonobstructive LAD disease, mid 50% with FFR 0.98.  She is on rosuvastatin for high intensity statin therapy amlodipine and Ranexa for antianginal therapy.  Patient was unable to tolerate isosorbide due to orthostasis.    Patient does report chest pain that occurs intermittently in her chest that occurs to her mid sternum slightly to the left sternal border.  Its intermittent in nature.  Says it is enough to bother her.  She says it almost feels like a few PACs that can last a few minutes and occurs on a daily basis.  She says as  long as she keeps rubbing it it seems to go away.  It does seem to be worse with activity.  It does scare her.    Patient does report shortness of breath occurs with activity.  Such as going up steps or walking.  She denies any significant dyspnea at rest or any orthopnea.    Patient reports palpitations where she has intermittent racing like sensation occurs in her chest on a weekly basis.  She is unable to quantify the duration of each episode.  She says she just keeps going and do not pay much attention to it.    Patient does report lower extremity edema that increases during the day.  And decreases overnight.    Patient reports that she is relatively active she is keeps going she likes to garden and and even canned foods.  She says she walks on a daily basis and feels like she has a good functional capacity.    We did review patient's labs.  Will focus on discharge labs that occurred on July 19.  Patient did have mild normocytic anemia 10.1 hemoglobin otherwise relatively unremarkable.  Patient's CMP showed a elevated glucose at 108, low BUN at 7 and a creatinine of 1.07 and EGFR of 53.    PRIOR MEDS  Current Outpatient Medications on File Prior to Visit   Medication Sig Dispense Refill    allopurinol (ZYLOPRIM) 100 MG tablet Take 1 tablet by mouth Daily.      amLODIPine (NORVASC) 5 MG tablet Take 1 tablet by mouth Daily.      aspirin 81 MG tablet Take 1 tablet by mouth Daily.      Creon [id number] units capsule delayed-release particles capsule       DICLOFENAC PO Take 50 mg by mouth Daily.      escitalopram (LEXAPRO) 10 MG tablet Take 1 tablet by mouth Daily.      FOLIC ACID PO Take  by mouth.      furosemide (LASIX) 40 MG tablet Take 1 tablet by mouth. Just when needed      meclizine (ANTIVERT) 25 MG tablet Take 1 tablet by mouth 2 (Two) Times a Day As Needed for Dizziness. 60 tablet 3    metoprolol tartrate (LOPRESSOR) 50 MG tablet Take 0.5 tablets by mouth Daily.      nitroglycerin (NITROSTAT) 0.4 MG SL  "tablet Place 1 tablet under the tongue As Needed for Chest Pain. Take no more than 3 doses in 15 minutes.      ranolazine (Ranexa) 500 MG 12 hr tablet Take 1 tablet by mouth 2 (Two) Times a Day. 60 tablet 11    rosuvastatin (CRESTOR) 40 MG tablet Take 1 tablet by mouth Daily. 90 tablet 0    traZODone (DESYREL) 100 MG tablet Take 1 tablet by mouth Every Night.       No current facility-administered medications on file prior to visit.       ALLERGIES  Codeine and Sulfa antibiotics    HISTORY  Past Medical History:   Diagnosis Date    Diabetes mellitus     Hyperlipidemia     Hypertension     Norovirus        Social History     Socioeconomic History    Marital status: Unknown   Tobacco Use    Smoking status: Never    Smokeless tobacco: Never   Vaping Use    Vaping status: Never Used   Substance and Sexual Activity    Alcohol use: No    Drug use: No    Sexual activity: Defer       Family History   Problem Relation Age of Onset    COPD Mother     Heart failure Mother     Heart attack Father     Diabetes Father        Review of Systems   Constitutional:  Positive for fatigue (Starting to improve- was recentyl admitted for norovirus).   HENT:  Positive for hearing loss.    Eyes:  Positive for visual disturbance (Glasses daily).   Respiratory:  Negative for chest tightness and shortness of breath.    Cardiovascular:  Positive for chest pain (Pt states she gets \"little pecks\" around sternum, notices them most when relaxes. Feels better after she rubs the area.), palpitations (Occasionally) and leg swelling (Goes down some overnight).   Gastrointestinal:  Positive for diarrhea.   Neurological:  Positive for dizziness, numbness (Finger tips and arms sometimes) and headaches. Negative for syncope.   Hematological:  Bruises/bleeds easily.   Psychiatric/Behavioral:  Positive for sleep disturbance (Off and on sleep issues).        Objective     VITALS: /66   Pulse 61   Ht 162.6 cm (64\")   Wt 81.5 kg (179 lb 9.6 oz)   SpO2 " 95%   BMI 30.83 kg/m²     LABS:   Lab Results (most recent)       None            IMAGING:   No Images in the past 120 days found..    EXAM:  Physical Exam  Vitals and nursing note reviewed.   Constitutional:       Appearance: She is well-developed.   HENT:      Head: Normocephalic.   Neck:      Thyroid: No thyroid mass.      Vascular: No carotid bruit or JVD.      Trachea: Trachea and phonation normal.   Cardiovascular:      Rate and Rhythm: Normal rate and regular rhythm.      Pulses:           Radial pulses are 2+ on the right side and 2+ on the left side.        Posterior tibial pulses are 2+ on the right side and 2+ on the left side.      Heart sounds: Normal heart sounds. No murmur heard.     No friction rub. No gallop.   Pulmonary:      Effort: Pulmonary effort is normal. No respiratory distress.      Breath sounds: Normal breath sounds. No wheezing or rales.   Musculoskeletal:         General: No swelling. Normal range of motion.      Cervical back: Neck supple.   Skin:     General: Skin is warm and dry.      Capillary Refill: Capillary refill takes less than 2 seconds.      Findings: No rash.   Neurological:      Mental Status: She is alert and oriented to person, place, and time.   Psychiatric:         Speech: Speech normal.         Behavior: Behavior normal.         Thought Content: Thought content normal.         Judgment: Judgment normal.         Procedure   Procedures       Assessment & Plan    Diagnosis Plan   1. Coronary artery disease involving native coronary artery of native heart without angina pectoris  CBC & Differential    Comprehensive Metabolic Panel    Magnesium      2. Other fatigue  CBC & Differential    Comprehensive Metabolic Panel    Magnesium      3. Precordial pain  CBC & Differential    Comprehensive Metabolic Panel    Magnesium      4. Shortness of breath  CBC & Differential    Comprehensive Metabolic Panel    Magnesium      5. Dyslipidemia  CBC & Differential    Comprehensive  Metabolic Panel    Magnesium      1.  Patient does have a history of nonobstructive coronary artery disease per left heart catheterization.  She continues to have chest pain that occurs to the left of her mid sternum.  Would recommend evaluation for noncardiac causes such as GI or pulmonology.  Will continue antianginal therapy of amlodipine and Ranexa as well as beta-blocker therapy.  She was previously unable to tolerate isosorbide.  2.  Patient's blood pressure is controlled on current blood pressure medication regimen.  No medication changes are warranted at this time.  Patient advised to monitor blood pressure on a daily basis and report any persistent highs or lows.  Set goal blood pressure for patient at 130/80 or below.  3.  Due to patient's electrolyte imbalance when she was in the hospital and continuation of diarrhea due to norovirus I would like to repeat a CBC CMP and magnesium.  She follows up with her PCP later next week.  4.  Informed of signs and symptoms of ACS and advised to seek emergent treatment for any new worsening symptoms.  Patient also advised sooner follow-up as needed.  Also advised to follow-up with family doctor as needed  This note is dictated utilizing voice recognition software.  Although this record has been proof read, transcriptional errors may still be present. If questions occur regarding the content of this record please do not hesitate to call our office.  I have reviewed and confirmed the accuracy of the ROS as documented by the MA/LPN/RN SHEN Ortega    Return in about 3 months (around 10/24/2025), or if symptoms worsen or fail to improve, for with Rola.    Jennifer Vidal  reports that she has never smoked. She has never used smokeless tobacco.      Advance Care Planning  ACP discussion was held with the patient during this visit. Patient has an advance directive (not in EMR), copy requested.     Patient did not bring med list or medicine bottles to appointment,  med list has been reviewed and updated based on patient's knowledge of their meds.      MEDS ORDERED DURING VISIT:  No orders of the defined types were placed in this encounter.      DISCONTINUED MEDS DURING VISIT:   There are no discontinued medications.     SHEN Ortega  This document has been electronically signed by SHEN Ortega  July 24, 2025 12:59 EDT    Dictated Utilizing Dragon Dictation: Part of this note may be an electronic transcription/translation of spoken language to printed text using the Dragon Dictation System

## 2025-08-11 ENCOUNTER — LAB (OUTPATIENT)
Dept: LAB | Facility: HOSPITAL | Age: 80
End: 2025-08-11
Payer: MEDICARE

## 2025-08-11 DIAGNOSIS — R07.2 PRECORDIAL PAIN: ICD-10-CM

## 2025-08-11 DIAGNOSIS — I10 ESSENTIAL HYPERTENSION: ICD-10-CM

## 2025-08-11 DIAGNOSIS — I25.10 CORONARY ARTERY DISEASE INVOLVING NATIVE CORONARY ARTERY OF NATIVE HEART WITHOUT ANGINA PECTORIS: ICD-10-CM

## 2025-08-11 DIAGNOSIS — R06.02 SHORTNESS OF BREATH: ICD-10-CM

## 2025-08-11 DIAGNOSIS — E78.5 DYSLIPIDEMIA: ICD-10-CM

## 2025-08-11 DIAGNOSIS — R53.83 OTHER FATIGUE: Primary | ICD-10-CM

## 2025-08-11 DIAGNOSIS — R53.83 OTHER FATIGUE: ICD-10-CM

## 2025-08-11 LAB
ALBUMIN SERPL-MCNC: 3.6 G/DL (ref 3.5–5.2)
ALBUMIN/GLOB SERPL: 1.2 G/DL
ALP SERPL-CCNC: 132 U/L (ref 39–117)
ALT SERPL W P-5'-P-CCNC: 8 U/L (ref 1–33)
ANION GAP SERPL CALCULATED.3IONS-SCNC: 12.3 MMOL/L (ref 5–15)
AST SERPL-CCNC: 16 U/L (ref 1–32)
BASOPHILS # BLD AUTO: 0.02 10*3/MM3 (ref 0–0.2)
BASOPHILS NFR BLD AUTO: 0.4 % (ref 0–1.5)
BILIRUB SERPL-MCNC: 0.4 MG/DL (ref 0–1.2)
BUN SERPL-MCNC: 12 MG/DL (ref 8–23)
BUN/CREAT SERPL: 11.8 (ref 7–25)
CALCIUM SPEC-SCNC: 8.6 MG/DL (ref 8.6–10.5)
CHLORIDE SERPL-SCNC: 104 MMOL/L (ref 98–107)
CHOLEST SERPL-MCNC: 94 MG/DL (ref 0–200)
CO2 SERPL-SCNC: 25.7 MMOL/L (ref 22–29)
CREAT SERPL-MCNC: 1.02 MG/DL (ref 0.57–1)
DEPRECATED RDW RBC AUTO: 44.5 FL (ref 37–54)
EGFRCR SERPLBLD CKD-EPI 2021: 56.1 ML/MIN/1.73
EOSINOPHIL # BLD AUTO: 0.22 10*3/MM3 (ref 0–0.4)
EOSINOPHIL NFR BLD AUTO: 4.3 % (ref 0.3–6.2)
ERYTHROCYTE [DISTWIDTH] IN BLOOD BY AUTOMATED COUNT: 13.5 % (ref 12.3–15.4)
GLOBULIN UR ELPH-MCNC: 3 GM/DL
GLUCOSE SERPL-MCNC: 137 MG/DL (ref 65–99)
HCT VFR BLD AUTO: 34.3 % (ref 34–46.6)
HDLC SERPL-MCNC: 40 MG/DL (ref 40–60)
HGB BLD-MCNC: 11.3 G/DL (ref 12–15.9)
IMM GRANULOCYTES # BLD AUTO: 0.02 10*3/MM3 (ref 0–0.05)
IMM GRANULOCYTES NFR BLD AUTO: 0.4 % (ref 0–0.5)
LDLC SERPL CALC-MCNC: 29 MG/DL (ref 0–100)
LDLC/HDLC SERPL: 0.62 {RATIO}
LYMPHOCYTES # BLD AUTO: 1.25 10*3/MM3 (ref 0.7–3.1)
LYMPHOCYTES NFR BLD AUTO: 24.3 % (ref 19.6–45.3)
MAGNESIUM SERPL-MCNC: 1.6 MG/DL (ref 1.6–2.4)
MCH RBC QN AUTO: 29.8 PG (ref 26.6–33)
MCHC RBC AUTO-ENTMCNC: 32.9 G/DL (ref 31.5–35.7)
MCV RBC AUTO: 90.5 FL (ref 79–97)
MONOCYTES # BLD AUTO: 0.33 10*3/MM3 (ref 0.1–0.9)
MONOCYTES NFR BLD AUTO: 6.4 % (ref 5–12)
NEUTROPHILS NFR BLD AUTO: 3.31 10*3/MM3 (ref 1.7–7)
NEUTROPHILS NFR BLD AUTO: 64.2 % (ref 42.7–76)
NRBC BLD AUTO-RTO: 0 /100 WBC (ref 0–0.2)
PLATELET # BLD AUTO: 257 10*3/MM3 (ref 140–450)
PMV BLD AUTO: 9.4 FL (ref 6–12)
POTASSIUM SERPL-SCNC: 3.6 MMOL/L (ref 3.5–5.2)
PROT SERPL-MCNC: 6.6 G/DL (ref 6–8.5)
RBC # BLD AUTO: 3.79 10*6/MM3 (ref 3.77–5.28)
SODIUM SERPL-SCNC: 142 MMOL/L (ref 136–145)
TRIGL SERPL-MCNC: 146 MG/DL (ref 0–150)
VLDLC SERPL-MCNC: 25 MG/DL (ref 5–40)
WBC NRBC COR # BLD AUTO: 5.15 10*3/MM3 (ref 3.4–10.8)

## 2025-08-11 PROCEDURE — 80053 COMPREHEN METABOLIC PANEL: CPT

## 2025-08-11 PROCEDURE — 36415 COLL VENOUS BLD VENIPUNCTURE: CPT

## 2025-08-11 PROCEDURE — 85025 COMPLETE CBC W/AUTO DIFF WBC: CPT

## 2025-08-11 PROCEDURE — 80061 LIPID PANEL: CPT

## 2025-08-11 PROCEDURE — 83735 ASSAY OF MAGNESIUM: CPT

## 2025-08-12 ENCOUNTER — RESULTS FOLLOW-UP (OUTPATIENT)
Dept: LAB | Facility: HOSPITAL | Age: 80
End: 2025-08-12
Payer: MEDICARE

## 2025-08-13 ENCOUNTER — TELEPHONE (OUTPATIENT)
Dept: CARDIOLOGY | Facility: CLINIC | Age: 80
End: 2025-08-13
Payer: MEDICARE